# Patient Record
Sex: MALE | Race: BLACK OR AFRICAN AMERICAN
[De-identification: names, ages, dates, MRNs, and addresses within clinical notes are randomized per-mention and may not be internally consistent; named-entity substitution may affect disease eponyms.]

---

## 2017-05-02 ENCOUNTER — HOSPITAL ENCOUNTER (OUTPATIENT)
Dept: HOSPITAL 84 - D.CATH | Age: 62
Discharge: HOME | End: 2017-05-02
Attending: INTERNAL MEDICINE
Payer: MEDICARE

## 2017-05-02 VITALS — BODY MASS INDEX: 34.93 KG/M2 | HEIGHT: 68 IN | WEIGHT: 230.48 LBS

## 2017-05-02 VITALS — DIASTOLIC BLOOD PRESSURE: 73 MMHG | SYSTOLIC BLOOD PRESSURE: 144 MMHG

## 2017-05-02 DIAGNOSIS — I25.82: ICD-10-CM

## 2017-05-02 DIAGNOSIS — E78.5: ICD-10-CM

## 2017-05-02 DIAGNOSIS — T82.855A: ICD-10-CM

## 2017-05-02 DIAGNOSIS — I25.119: Primary | ICD-10-CM

## 2017-05-02 DIAGNOSIS — I10: ICD-10-CM

## 2017-05-02 LAB
ANION GAP SERPL CALC-SCNC: 17.8 MMOL/L (ref 8–16)
BASOPHILS NFR BLD AUTO: 0.6 % (ref 0–2)
BUN SERPL-MCNC: 43 MG/DL (ref 7–18)
CALCIUM SERPL-MCNC: 7.8 MG/DL (ref 8.5–10.1)
CHLORIDE SERPL-SCNC: 102 MMOL/L (ref 98–107)
CO2 SERPL-SCNC: 24.8 MMOL/L (ref 21–32)
CREAT SERPL-MCNC: 10 MG/DL (ref 0.6–1.3)
EOSINOPHIL NFR BLD: 12.4 % (ref 0–7)
ERYTHROCYTE [DISTWIDTH] IN BLOOD BY AUTOMATED COUNT: 18.2 % (ref 11.5–14.5)
GLUCOSE SERPL-MCNC: 112 MG/DL (ref 74–106)
HCT VFR BLD CALC: 47 % (ref 42–54)
HGB BLD-MCNC: 14.7 G/DL (ref 13.5–17.5)
IMM GRANULOCYTES NFR BLD: 0.1 % (ref 0–5)
LYMPHOCYTES NFR BLD AUTO: 19.9 % (ref 15–50)
MCH RBC QN AUTO: 29.1 PG (ref 26–34)
MCHC RBC AUTO-ENTMCNC: 31.3 G/DL (ref 31–37)
MCV RBC: 93.1 FL (ref 80–100)
MONOCYTES NFR BLD: 8.8 % (ref 2–11)
NEUTROPHILS NFR BLD AUTO: 58.2 % (ref 40–80)
OSMOLALITY SERPL CALC.SUM OF ELEC: 289 MOSM/KG (ref 275–300)
PLATELET # BLD: 186 10X3/UL (ref 130–400)
PMV BLD AUTO: 10.7 FL (ref 7.4–10.4)
POTASSIUM SERPL-SCNC: 5.6 MMOL/L (ref 3.5–5.1)
RBC # BLD AUTO: 5.05 10X6/UL (ref 4.2–6.1)
SODIUM SERPL-SCNC: 139 MMOL/L (ref 136–145)
WBC # BLD AUTO: 7.2 10X3/UL (ref 4.8–10.8)

## 2017-05-02 NOTE — HEMODYNAMI
PATIENT:BALA SCHULTZ                               MEDICAL RECORD: [id number]
: 57                                            LOCATION:D.CAT          
ACCT# [id number]                                       ADMISSION DATE: 17
 
 
 Generatedon:201710:24
Patient name: BALA SCHULTZ Patient #: [id number] Visit #: [id number] SSN: :
 1955
Date of study: 2017
Page: Of
Hemodynamic Procedure Report
****************************
Patient Data
Patient Demographics
Procedure consent was obtained
First Name: BALA          Gender: Male
Last Name: MARION            : 1955
[facility] Initial: WILD       Age: 61 year(s)
Patient #: [id number]       Race: Black
Visit #: [id number]
Accession #:
[id number]
Additional ID: [id number]
Contact details
Address: [address] [place] [address] Phone: [phone]
State: [place]
City: [place]
Zip code: [address]
Past Medical History
Allergies
Allergen        Reaction        Date         Comments
Reported
Other allergy                   2017     Gabapentin
Admission
Admission Data
Admission Date: 2017    Admission Time: 8:03
Lab Results
Lab Result Date: 2017   Lab Result Time: 0:00
Biochemistry
Name         Units    Result                Min      Max
BUN          mg/dl    43       --(----)-*   7        18
Creatinine   mg/dl    10       --(----)-*   0.6      1.3
Procedure
Procedure Types
Cath Procedure
Diagnostic Procedure
LHC
LH w/Coronaries
Procedure Description
Procedure Date
Procedure Date: 2017
Procedure Start Time: 10:09
Procedure End Time: 10:23
Procedure Staff
Name                            Function
Saurabh Galan MD                Performing Physician
Keely Long RT               Scrub
Mary Almaguer RN                  Nurse
Charles Phan RT                  Monitor
 
Procedure Data
Cath Procedure
Fluoroscopy
Diagnostic fluoroscopy      Total fluoroscopy Time: 1.2
time: 1.2 min               min
Diagnostic fluoroscopy      Total fluoroscopy dose: 517
dose: 517 mGy               mGy
Contrast Material
Contrast Material Type                       Amount (ml)
Isovue 300                                   51
Entry Location
Entry     Primary  Successful  Side  Size  Upsize Upsize Entry    Closure Succes
sful  Closure
Location                             (Fr)  1 (Fr) 2 (Fr) Remarks  Device        
      Remarks
Femoral                        Right 5 Fr                         Exoseal
artery
Estimated blood loss: 10 ml
Diagnostic catheters
Device Type               Used For           End Catheter
Placement
Medtronic Dexterity 5Fr   Procedure
Pigtail catheter (NO
CHARGE)
Medtronic Dexterity 5Fr   Procedure
JL 4.0 catheter (NO
CHARGE)
Medtronic Dexterity 5Fr   Procedure
3DRC catheter (NO CHARGE)
Procedure Medications
Medication           Administration Route Dosage
Oxygen               NC                   2 l/min
Lidocaine 2%         added to field       20
Heparin Flush Bag    added to field       2 bags
(1000units/500ml NS)
0.9% NaCl            I.V.                 50 ml/hr
Versed               I.V.                 1 mg
Fentanyl             I.V.                 50 mcg
Versed               I.V.                 1 mg
Fentanyl             I.V.                 50 mcg
Hemodynamics
Rest
Heart Rate: 61 (bpm)
Pressure Samples
Time     Site     Value (mmHg) Purpose      Heart      Use
Rate(bpm)
10:11    LV       109/24,28    Snapshot     87
Snapshots
Pre Cath      Intra         NCS           Post Cath
Vital Signs
Time     Heart  Resp   SPO2 NIBP (mmHg) Rhythm  Pain    Sedation
Rate   (ipm)  (%)                      Status  Level
(bpm)
9:20:42  68     17     98   183/62(135) NSR     0 (11)  10(A)
, No
pain
9:25:39  67     27     98   178/61(129) NSR     0 (11)  10(A)
, No
pain
9:30:32  67     21     94   142/60(116) NSR     0 (11)  10(A)
 
, No
pain
9:35:08  65     17     93   156/64(122) NSR     0 (11)  10(A)
, No
pain
9:39:51  69     15     96   148/67(125) NSR     0 (11)  10(A)
, No
pain
9:45:37  68     19     97   145/60(122) NSR     0 (11)  10(A)
, No
pain
9:50:17  70     22     96   143/55(100) NSR     0 (11)  10(A)
, No
pain
9:54:58  68     17     95   154/59(92)  NSR     0 (11)  10(A)
, No
pain
9:59:42  66     16     94   140/57(121) NSR     0 (11)  10(A)
, No
pain
10:04:21 70     19     94   129/60(99)  NSR     0 (11)  10(A)
, No
pain
10:10:03 69     19     95   145/53(89)  NSR     0 (11)  9(A)
, No
pain
10:14:44 71     17     95   157/59(116) NSR     0 (11)  9(A)
, No
pain
10:19:30 70     16     91   162/56(114) NSR     0 (11)  10(A)
, No
pain
Medications
Time     Medication       Route  Dose  Verified Delivered Reason     Notes  Effe
ctiveness
by       by
9:25:16  Oxygen           NC     2     Saurabh  Buffie    used for
l/min Aadma Almaguer RN   procedure
9:25:23  Lidocaine 2%     added  20ml  Saurabh  Saurabh   for local
to     vial  Adama Galan MD  anesthetic
field
9:25:28  Heparin Flush    added  2     Saurabh  Saurabh   used for
Bag              to     bags  Adama Galan MD  procedure
(1000units/500ml field
NS)
9:25:37  0.9% NaCl        I.V.   50    Saurabh  Buffie    Per
ml/hr Adama Almaguer RN   physician
10:07:51 Versed           I.V.   1 mg  Saurabh Hernandezie    for
Adama Almaguer RN   sedation
10:07:56 Fentanyl         I.V.   50    Saurabh  Buffie    for
mcg   Adama Almaguer RN   sedation
10:14:42 Versed           I.V.   1 mg  Saurabh  Buffie    for
Adama Almaguer RN   sedation
10:14:46 Fentanyl         I.V.   50    Saurabh  Buffie    for
mcg   Adama Almaguer RN   sedation
Procedure Log
Time     Note
9:14:00  Diagnostic Cath Status : Elective
9:14:52  Keely Long RT(R) sent for patient. Start room use.
9:14:56  Time tracking: Regular hours
 
9:15:03  Plan of Care:Hemodynamics will remain stable., Cardiac
rhythm will remain stable., Comfort level will be
maintained., Respiratory function will remain
adequate., Patient/ family verbilizes understanding of
procedure., Procedure tolerated without complication.,
Recovers from procedure without complications..
9:15:13  Patient received from Pre/Post Procedure Room to CCL 2
Alert and oriented. Tansferred to table in Supine
position.
9:15:16  Warm blankets applied, and kemal hugger turned on for
patient comfort.
9:15:18  Correct patient and procedure confirmed by team.
9:15:20  Signed procedure consent form obtained from patient.
9:15:24  ECG and BP/O2 sat monitors applied to patient.
9:18:57  Vital chart was started
9:19:56  Baseline sample Acquired.
9:20:04  Full Disclosure recording started
9:21:22  Snore? Yes
9:21:23  Sleep apnea? Yes
9:21:28  Patient diabetic? Yes.
9:21:29  If diabetic: On Metformin? No
9:23:41  Pre-procedure instructions explained to patient.
9:23:43  Family in waiting room.
9:23:45  Patient NPO since Midnight.
9:24:12  Patient allergic to Other allergyGabapentin
9:24:15  Is the patient allergic to Iodine/contrast media? No.
9:24:18  Is patient on blood thinner?No
9:24:31  Dentures? No ?
9:24:49  IV patent on arrival in left forearm with 0.9% NaCl at
[facility].
9::56  Lab results completed and on chart, pending.
9:25:00  Right groin area was prepped with chlora-prep and
draped in sterile fashion
9:25:01  Alarms reviewed by R. N.
9:25:02  Sharps counted by scrub and verified by R.N.
9:25:02  Physician paged
9:25:16  Oxygen 2 l/min NC was administered by Mary Almaguer RN;
used for procedure;
9:25:23  Lidocaine 2% 20ml vial added to field was administered
by Saurabh Galan MD; for local anesthetic;
9:25:28  Heparin Flush Bag (1000units/500ml NS) 2 bags added to
field was administered by Saurabh Galan MD; used for
procedure;
9:25:37  0.9% NaCl 50 ml/hr I.V. was administered by Mary Almaguer RN; Per physician;
9::28  Use device set Femoral Dx
9:26:30  Acist Syringe opened to sterile field.
9:26:30  Bag Decanter opened to sterile field.
9:26:36  Medline Cath Pack opened to sterile field.
9:26:36  Terumo 5Fr [place] Sheath opened to sterile field.
9:26:37  St Vladimir 260cm J .035 wire opened to sterile field.
9:26:39  Acist Hand Control opened to sterile field.
9:26:40  Acist Manifold opened to sterile field.
9::44  Tegaderm 4 x 4 opened to sterile field.
9::40  Lab Result : BUN 43 mg/dl
9:29:40  Lab Result : Creatinine 10 mg/dl
9:33:12  Deviated septum? No
9:33:14  Opens mouth fully? Yes
9:33:17  Sticks out tongue? Yes
9:33:21  Airway obstruction? No ?
 
9:33:49  Pre procedure: right dorsailis pedis pulse 1+
Palpable, but thready & weak; easily obliterated
9:33:54  Patient pain scale 0/10 ?.
9:34:12  Patient is right arm reserve
9:36:11  Dr. Galan in room 1
9:52:50  Baseline sample Acquired.
9:52:59  Rhythm: sinus rhythm
9:53:06  Baseline sample Acquired.
10:07:08 Physician arrived
10:07:09 --------ALL STOP TIME OUT------
10:07:10 Final Timeout: patient, procedure, and site verified
with staff and physician. All members of the team are
in agreement.
10:07:12 Right groin site verified by team.
10:07:16 Physical assessment completed. ASA score P 3 - A
patient with severe systemic disease as per Saurabh Galan MD.
10:07:21 Sedation plan: IV Moderate Sedation Versed, Fentanyl
10:07:36 Zero performed for pressure channel P1
10:07:51 Versed 1 mg I.V. was administered by Mary Almaguer RN;
for sedation;
10:07:56 Fentanyl 50 mcg I.V. was administered by Mary Almaguer
RN; for sedation;
10:09:21 Procedure started.
10:09:27 Local anesthetic to right femoral artery with
Lidocaine 2% by Saurabh Galan MD.**INITIAL ACCESS
ONLY**
10:10:16 A 5 Fr sheath was inserted into the Right Femoral
artery
10:10:28 A Medtronic Dexterity 5Fr Pigtail catheter (NO CHARGE)
was advanced over the wire and used for Procedure.
10:11:42 LV hemodynamics recorded.
10:11:54 LV gram done using MEDRANO
10:11:58 LV Function : Abnormal
10:12:04 EF : 30 %
10:12:07 Catheter removed.
10:12:24 A Medtronic Dexterity 5Fr JL 4.0 catheter (NO CHARGE)
was advanced over the wire and used for Procedure.
10:12:53 LCA angiography performed.
10:14:07 Catheter removed.
10:14:16 A Medtronic Dexterity 5Fr 3DRC catheter (NO CHARGE)
was advanced over the wire and used for Procedure.
10:14:42 Versed 1 mg I.V. was administered by Mary Almaguer RN;
for sedation;
10:14:46 Fentanyl 50 mcg I.V. was administered by Mary Almaguer
RN; for sedation;
10:14:49 RCA angiography performed.
10:14:57 Catheter removed.
10:15:51 Cordis 5Fr Exoseal opened to sterile field.
10:16:05 Sheath removed intact; hemostasis achieved with
Exoseal to the Right Femoral artery.
10:16:07 Procedure ended.(Physican Out)
10:16:21 Fluoroscopy time 01.20 minutes.
10:16:29 Flurop Dose total: 517
10:16:29 Fluoroscopy dose: 517 mGy
10:16:40 Contrast amount:Isovue 300 51ml.
10:16:41 Sharps counted by scrub and verified by R.N.
10:21:26 Insertion/operative site no bleeding no hematoma.
10:21:30 Post-op/insertion site Right Femoral artery dressed
using a 4 x 4 and Tegaderm.
 
10:21:35 Post right femoral artery:stable
10:21:38 Post Procedure Pulses reassessed and unchanged
10:21:43 Post-procedure physical assessment completed. ASA
score P 3 - A patient with severe systemic disease as
per Saurabh Galan MD.
10:21:47 Post procedure rhythm: unchanged.
10:21:50 Estimated blood loss: 10 ml
10:21:51 Post procedure instruction explained to
patient.Patient verbalizes understanding.
10:21:52 Patient needs reinforcement of post procedure
teaching.
10:21:54 Procedure and supply charges have been captured,
reviewed, submitted and are correct.
10:22:37 Vital chart was stopped
10:23:11 See physician's report for complete and final results.
10:23:13 Report given to Pre/Post Procedure Room.
10:23:35 Patient transfered to Pre/Post Procedure Room with
Stretcher.
10:23:39 Procedure ended.
10:23:39 Full Disclosure recording stopped
10:23:42 End room use (Document Last)
Device Usage
Item Name Manufacture  Quantity  Catalog     Hospital Part    Current Minimal Lo
t# /
Number      Charge   Number  Stock   Stock   Serial#
Code
Acist     Acist        1         63855       315826   155417  239380  20
Everloop
Bag       [employer]tek     1                970233   77164   095306  5
[employer]
Medline   Cardinal     1         [id number]   847583   53178   244718  5
Cath Pack [employer]
Terumo    Terumo       1         [id number]      597059   762355  631160  40
5Fr
[place]
Sheath
St Vladimir   St Vladimir      1         917147      871334   406801  782844  30
260cm J
.035 wire
Acist     Acist        1         47659       334337   559641  452094  5
Hand      Medical
Control   Systems Inc
Acist     Acist        1         33785       815806   128542  130976  5
Manifold  Medical
Systems [employer]
Tegaderm  3M           1         1626W       960337   718508  022682  5
4 x 4
Medtronic Medtronic    1         [id number]  902140           183154  5
Dexterity
5Fr
Pigtail
catheter
(NO
CHARGE)
Medtronic Medtronic    1         [id number]    125946           328699  5
Dexterity
5Fr JL
4.0
catheter
 
(NO
CHARGE)
Medtronic Medtronic    1         [id number]    877095           169073  5
Dexterity
5Fr 3DRC
catheter
(NO
CHARGE)
Cordis    Cardinal     1                433621   321247  276058  10
5Fr       Health
Exoseal
Signature Audit Raysal
Stage           Time        Signature      Unsigned
Intra-Procedure 2017    Charles Phan
10:24:15 AM RT(R) (CV)
Signatures
Monitor : Charles Phan RT Signature :
______________________________
Date : ______________ Time :
______________
 
 
 
 
 
 
 
 
 
 
 
 
 
 
 
 
 
 
 
 
 
 
 
 
 
 
 
 
 
 
 
 
 
 
 
[address] [place] [address]

## 2017-05-02 NOTE — NUR
RESTING QUIETLY WITH EYES CLOSED. VSS. 2L NC, NO RESP DISTRESS NOTED.
RIGHT GROIN EXOSEAL CDI, NO BLEEDING OR HEMATOMA NOTED. NO C/O CHEST
PAIN OR NAUSEA. WILL CONTINUE TO MONITOR.

## 2017-05-02 NOTE — NUR
2L NC, NO RESP DISTRESS NOTED. RIGHT GROIN EXOSEAL CDI, NO BLEEDING
OR HEMATOMA NOTED. NO C/O CHEST PAIN OR NAUSEA. VSS. CARDIAC MONITOR
SHOWS NSR @ 70. INSTRUCTED PT TO KEEP HEAD FLAT ON PILLOW AND RIGHT
LEG STRAIGHT.

## 2017-05-02 NOTE — OP
PATIENT NAME:  BALA SCHULTZ                        MEDICAL RECORD: [id number]
:57                                             LOCATION:D.CAT          
                                                         ADMISSION DATE:        
SURGEON:  BREANN MUHAMMAD MD             
 
 
DATE OF OPERATION:  2017
 
PROCEDURES:
1.  Left heart catheterization.
2.  Selective coronary angiography.
3.  Left ventriculogram.
 
PROCEDURE IN DETAIL:  After informed consent was obtained and after a detailed
explanation of risks, benefits as well as alternative therapies, the patient
elected to proceed with angiogram and heart catheterization.  The right femoral
area was prepped and draped in normal sterile fashion.  The right femoral artery
was cannulated via modified Seldinger technique with placement of 6-Pakistani
sheath.  All catheters exchanged through this sheath.
 
FINDINGS:  The left ventriculogram was performed in the standard 30-degree MEDRANO
view, reveals ejection fraction in the 35% range.
 
SELECTIVE CORONARY ANGIOGRAPHY:
1.  Left main showed no significant angiographic disease.
2.  Left anterior descending has previously placed stents, these are widely
patent with no significant restenosis.
3.  Left circumflex has moderate irregularities, but no flow-limiting stenosis.
4.  Right coronary has previously placed stents, these were totally occluded in
the mid vessel, there is chronic total occlusion.  Distal right coronary fills
via left-to-right collaterals.
 
OVERALL IMPRESSION:  Total occlusion of the RCA with left to right collaterals. 
Otherwise, no significant disease.  Continue medical management of the coronary
artery disease and cardiac risk factors.
 
TRANSINT:[id number] Voice Confirmation ID: [id number] DOCUMENT ID: [id number]
                                           
                                           BREANN MUHAMMAD MD             
 
 
 
 
 
 
 
 
 
 
 
CC:                                                             [id number]
DICTATION DATE: 17 1028     :     17 1119      [address] [place] [address]

## 2017-05-02 NOTE — NUR
2L NC, NO  RESP DISTRESS. RIGHT GROIN EXOSEAL KATLYN, NO BLEEDING OR
HEMATOMA NOTED. MONITOR SHOWS NSR @ 68. VSS. NO C/O CHEST PAIN OR
NAUSEA. WIFE AT BEDSIDE, CALL LIGHT WITHIN REACH.

## 2017-05-02 NOTE — HP
PATIENT: BALA CLARKE                              MEDICAL RECORD: [id number]
ACCOUNT: [id number]                                    LOCATION:D.CAT         
: 57                                            ADMISSION DATE: 17
                                                         
 
                             HISTORY AND PHYSICAL EXAMINATION
 
 
ADMITTING DIAGNOSES:
1.  Angina.
2.  Coronary artery disease.
3.  Previous percutaneous transluminal coronary angioplasty stent, last being in
.
4.  Hyperlipidemia.
5.  Hypertension.
 
HISTORY OF PRESENT ILLNESS:  Mr. Clarke has increasing anginal symptomatology
just like that of his previous angina.  Last cardiac stent was approximately 3
years ago.  He has been in an escalating fashion despite maximal medical therapy
with calcium channel blockers and beta blockers.
 
PHYSICAL EXAMINATION:
GENERAL APPEARANCE:  Well-nourished, well-developed, appears stated age.  Level
of distress, comfortable. 
PSYCHIATRIC:  Mental status, alert, normal affect.  Orientation, oriented to
time, place and person.  
EYES:  Lids and conjunctiva, noninjected.  No discharge, no pallor. 
ENT:  Lips, teeth, gums, normal dentition.  Oropharynx, no cyanosis, no pallor. 
NECK:  Carotid arteries, bilateral normal upstroke, no bruits, no thrills. 
JUGULAR VEINS:  No jugular venous pressure or distention. 
CERVICAL LYMPH NODES:  Nontender, nonenlarged.  
THYROID:  Not enlarged.  Nontender.  No nodules. 
LUNGS:  Respiratory effort, unlabored. 
CHEST:  Normal curvature.  No thoracic deformity.  No chest wall tenderness. 
Percussion, resonant.  Auscultation, clear.  No wheezes, no rales, no rhonchi. 
CARDIOVASCULAR:  Precordial exam, nondisplaced.  No heaves or pericardial
thrills.  Rate and rhythm, regular.  Heart sounds, normal S1, normal S2.  No S3,
no gallop, no rub.  Systolic murmur, not heard.  Diastolic murmur, not heard. 
EXTREMITIES:  No cyanosis, no edema.  Peripheral pulses, full and equal in all
extremities, except as noted.  No bruits appreciated. 
ABDOMEN:  Soft, nondistended.  Normal aorta.  No bruit.  Nontender.  No masses. 
Liver, nontender, no hepatomegaly.  Spleen, nontender, no splenomegaly.  
MUSCULOSKELETAL:  No joint tenderness.  No joint swelling.  No erythema.  
NEUROLOGICAL:  Normal gait, normal strength, normal tone.
SKIN:  Warm and dry.
 
REVIEW OF SYSTEMS:  The patient reports easy bruising but reports no swollen
glands. The patient reports no fever, no night sweats, no significant weight
gain, no significant weight loss.  No significant exercise tolerance.  The
patient reports no dry eyes, no irritation, no vision change.  Patient reports
no difficulty hearing and no ear pain.  Patient reports no frequent nose bleeds
or nose and sinus problems.  Patient reports on arm pain on exertion.   No
shortness of breath while lying down.  No history of heart murmur.  Patient
reports no cough, no wheezing or coughing up blood.  Patient reports no
abdominal pain, no vomiting.  Normal appetite.  No diarrhea and not vomiting
blood.  No nausea and no constipation.  Patient reports no incontinence.  No
difficulty urinating.  No hematuria.  No increased frequency.  Patient reports
no muscle aches.  No weakness, no arthralgias, no back pain.  No swelling of the
 
 
 
HISTORY AND PHYSICAL                           [id number]    MARION,BALA WILD      
 
 
extremities.  Patient reports no abnormal mole, no jaundice, no rashes.  Reports
no loss of consciousness.  No weakness and no numbness.  No seizures, dizziness,
or headaches.  The patient reports no depression, no sleep disturbance, feeling
safe in a relationship and no alcohol abuse.  Patient reports on fatigue. 
Reports no runny nose or sinus pressure.  No itching, no hives, and no frequent
sneezing.  
 
OVERALL IMPRESSION:  Increasing angina.  We will proceed with coronary
angiography.  Further care depends upon findings of the angiography.
 
TRANSINT:[id number] Voice Confirmation ID: [id number] DOCUMENT ID: [id number]
 
 
                                           
                                           BREANN MUHAMMAD MD             
 
 
 
 
 
 
 
 
 
 
 
 
 
 
 
 
 
 
 
 
 
 
 
 
 
 
 
 
 
 
 
 
CC:                                                             [id number]
DICTATION DATE: 17     :     17      REG [facility]                                          
[address] [place] [address]

## 2017-11-20 ENCOUNTER — HOSPITAL ENCOUNTER (OUTPATIENT)
Dept: HOSPITAL 84 - D.CT | Age: 62
Discharge: HOME | End: 2017-11-20
Attending: INTERNAL MEDICINE
Payer: MEDICARE

## 2017-11-20 VITALS — BODY MASS INDEX: 35 KG/M2

## 2017-11-20 DIAGNOSIS — N18.6: Primary | ICD-10-CM

## 2017-11-20 DIAGNOSIS — Z86.79: ICD-10-CM

## 2017-12-28 ENCOUNTER — HOSPITAL ENCOUNTER (OUTPATIENT)
Dept: HOSPITAL 84 - D.CT | Age: 62
Discharge: HOME | End: 2017-12-28
Attending: INTERNAL MEDICINE
Payer: MEDICARE

## 2017-12-28 VITALS — BODY MASS INDEX: 35 KG/M2

## 2017-12-28 DIAGNOSIS — J98.11: Primary | ICD-10-CM

## 2018-07-31 ENCOUNTER — HOSPITAL ENCOUNTER (OUTPATIENT)
Dept: HOSPITAL 84 - D.CT | Age: 63
Discharge: HOME | End: 2018-07-31
Attending: INTERNAL MEDICINE
Payer: MEDICARE

## 2018-07-31 VITALS — BODY MASS INDEX: 35 KG/M2

## 2018-07-31 DIAGNOSIS — J98.11: Primary | ICD-10-CM

## 2019-02-18 ENCOUNTER — HOSPITAL ENCOUNTER (OUTPATIENT)
Dept: HOSPITAL 84 - D.OPS | Age: 64
Discharge: HOME | End: 2019-02-18
Attending: OTOLARYNGOLOGY
Payer: MEDICARE

## 2019-02-18 VITALS
WEIGHT: 235 LBS | HEIGHT: 68 IN | BODY MASS INDEX: 35.61 KG/M2 | WEIGHT: 235 LBS | HEIGHT: 68 IN | BODY MASS INDEX: 35.61 KG/M2

## 2019-02-18 VITALS — DIASTOLIC BLOOD PRESSURE: 90 MMHG | SYSTOLIC BLOOD PRESSURE: 153 MMHG

## 2019-02-18 DIAGNOSIS — Z01.812: ICD-10-CM

## 2019-02-18 DIAGNOSIS — E11.22: ICD-10-CM

## 2019-02-18 DIAGNOSIS — Z95.0: ICD-10-CM

## 2019-02-18 DIAGNOSIS — K21.9: ICD-10-CM

## 2019-02-18 DIAGNOSIS — J32.0: Primary | ICD-10-CM

## 2019-02-18 DIAGNOSIS — Z95.1: ICD-10-CM

## 2019-02-18 DIAGNOSIS — Z79.02: ICD-10-CM

## 2019-02-18 DIAGNOSIS — Z79.891: ICD-10-CM

## 2019-02-18 DIAGNOSIS — Z99.2: ICD-10-CM

## 2019-02-18 DIAGNOSIS — N18.6: ICD-10-CM

## 2019-02-18 LAB
ANION GAP SERPL CALC-SCNC: 18.7 MMOL/L (ref 8–16)
APTT BLD: 41.9 SECONDS (ref 22.8–39.4)
BASOPHILS NFR BLD AUTO: 0.1 % (ref 0–2)
BUN SERPL-MCNC: 43 MG/DL (ref 7–18)
CALCIUM SERPL-MCNC: 8.5 MG/DL (ref 8.5–10.1)
CHLORIDE SERPL-SCNC: 103 MMOL/L (ref 98–107)
CO2 SERPL-SCNC: 23.3 MMOL/L (ref 21–32)
CREAT SERPL-MCNC: 8.5 MG/DL (ref 0.6–1.3)
EOSINOPHIL NFR BLD: 1.8 % (ref 0–7)
ERYTHROCYTE [DISTWIDTH] IN BLOOD BY AUTOMATED COUNT: 15.8 % (ref 11.5–14.5)
GLUCOSE SERPL-MCNC: 123 MG/DL (ref 74–106)
HCT VFR BLD CALC: 40.3 % (ref 42–54)
HGB BLD-MCNC: 13.3 G/DL (ref 13.5–17.5)
IMM GRANULOCYTES NFR BLD: 0.2 % (ref 0–5)
INR PPP: 1.08 (ref 0.85–1.17)
LYMPHOCYTES NFR BLD AUTO: 11.1 % (ref 15–50)
MCH RBC QN AUTO: 29.8 PG (ref 26–34)
MCHC RBC AUTO-ENTMCNC: 33 G/DL (ref 31–37)
MCV RBC: 90.2 FL (ref 80–100)
MONOCYTES NFR BLD: 5.1 % (ref 2–11)
NEUTROPHILS NFR BLD AUTO: 81.7 % (ref 40–80)
OSMOLALITY SERPL CALC.SUM OF ELEC: 290 MOSM/KG (ref 275–300)
PLATELET # BLD: 205 10X3/UL (ref 130–400)
PMV BLD AUTO: 10.8 FL (ref 7.4–10.4)
POTASSIUM SERPL-SCNC: 5 MMOL/L (ref 3.5–5.1)
PROTHROMBIN TIME: 13.5 SECONDS (ref 11.6–15)
RBC # BLD AUTO: 4.47 10X6/UL (ref 4.2–6.1)
SODIUM SERPL-SCNC: 140 MMOL/L (ref 136–145)
WBC # BLD AUTO: 8.4 10X3/UL (ref 4.8–10.8)

## 2019-02-20 LAB — SPECIMEN PREPARATION: (no result)

## 2019-02-25 NOTE — OP
PATIENT NAME:  BALA SCHULTZ                        MEDICAL RECORD: [id number]
:57                                             LOCATION:[facility]          
                                                         ADMISSION DATE:        
SURGEON:  RHONDA GILLIAM MD                
 
 
DATE OF OPERATION:  2019
 
PREOPERATIVE DIAGNOSES:  Bilateral chronic maxillary sinusitis.
 
POSTOPERATIVE DIAGNOSES:  Bilateral chronic maxillary sinusitis.
 
PROCEDURES:  Bilateral middle meatal antrostomy.
 
SURGEON:  Rhonda Gilliam MD
 
ANESTHESIA:  General orotracheal.
 
BLOOD LOSS:  Less than 5 cc.
 
SPECIMENS:  Cultures and path of material from the left maxillary sinus.
 
COMPLICATIONS:  None.
 
PACKING:  None.
 
DISPOSITION:  Recovery, stable.
 
DESCRIPTION OF PROCEDURE:  He was brought to the operating room, placed in the
supine position, and sedated and intubated by anesthesia.  Both sides of the
nose were examined using headlight and nasal speculum.  Inferior turbinate,
middle turbinate, and uncinate lateral nasal wall were injected with a total 1.5
cc of 1% lidocaine with 1:100,000 epinephrine.  He had been decongested with
Afrin preoperatively.  Two Afrin pledgets were placed in the middle meatus
bilaterally and table was turned 90 degrees.  He was positioned, prepped, and
draped in the usual fashion for nasal surgery.  Using a 0-degree scope, both
sides of the nose were examined.  All the Afrin pledgets were removed.  The
right side was examined first.  Inferior turbinate was normal.  The right middle
meatus was opened slightly, but the maxillary sinus was fairly clean and there
was nothing in there.  The ethmoid cavity was checked as well and it was clean. 
The sinus was irrigated and examined, and was normal; just opened up a little
bit there.  The nasal cavity and nasopharynx were normal.  There was some septal
deviation, but not too bad.  The left side was then examined and there was
obvious purulence coming from the middle meatus.  The middle turbinate was
gently medialized.  There was a Gurdeep's cell there with some granular changes
and some granular edematous tissue covering the natural meatus.  Forceps was
used to remove quite a bit of that for pathology.  Then, the middle meatus was
entered with a large curved olive tip suction.  It was suctioned with a Luki
trap.  Some chunks of solid green material were obtained.  This was sent for
cultures including aerobic, anaerobic, and fungal.  Using a microdebrider, the
anterior ethmoids were taken down somewhat and then the maxillary ostia was
opened widely, again the 30-degree scope in there and long curved olive tip
suction.  The superior sinus cleaned out easily, but the inferior half of the
sinus was basically a solid block of hard green material.  This could be broken
up, but it was difficult to remove.  Another curved olive tip suction was
inserted in the inferior meatus and popped into the maxillary sinus to help
break up and debride that material, that combined with irrigating in one suction
and suctioning with other simultaneously with 100 cc of saline, was able to
 
 
 
OPERATIVE REPORT                               [id number]    BALA SCHULTZ      
 
 
break up and remove all that.  I then examined the maxillary sinus with 30- and
70-degree scopes.  There were some granular irregular changes to the mucosa from
chronic infection, but the sinus was completely clean with a nice clean large
maxillary ostia.  The ethmoids were irrigated.  The nasopharynx was irrigated
with everything completely clean and dry.  Curved olive tip suction with some
mupirocin was used to place in the left maxillary sinus and the ethmoid cavity. 
He was awakened, extubated, and transported to recovery in good condition.  No
complications.
 
TRANSINT:[id number] Voice Confirmation ID: [id number] DOCUMENT ID: [id number]
                                           
                                           RHONDA GILLIAM MD                
 
 
 
Electronically Signed by RHONDA GILLIAM on 19 at 0845
 
 
 
 
 
 
 
 
 
 
 
 
 
 
 
 
 
 
 
 
 
 
 
 
 
 
 
 
 
 
 
CC:                                                             [id number]
DICTATION DATE: 19 1504     :     19 1743      [facility] 
                                                                      19
[facility]                                          
[address] [place] [address]

## 2019-02-25 NOTE — HP
PATIENT: BALA CLARKE                              MEDICAL RECORD: [id number]
ACCOUNT: [id number]                                    LOCATION:\[facility] [place] [facility]\""         
: 57                                            ADMISSION DATE: 19
                                                         PCP: JAYMIE PAULSON MD           
 
                             HISTORY AND PHYSICAL EXAMINATION
 
 
HISTORY:  Mr. Clarke has been having chronic problems with drainage and been
found to have opacified left maxillary sinus, has not been responding to medical
therapy.  He is being admitted for bilateral middle meatal antrostomy.
 
PAST MEDICAL HISTORY:  Includes diabetes, end-stage renal disease, reflux,
pacemaker.
 
PAST SURGICAL HISTORY:  Includes CABG, hernia repair, knee surgery.  He is on
dialysis.
 
MEDICATIONS:  Include Plavix, amlodipine, tramadol, Lyrica, metoprolol,
omeprazole, Linzess, Sensipar, Breo, Renvela, albuterol.
 
ALLERGIES:  No known drug allergies.
 
PHYSICAL EXAMINATION:
GENERAL:  Healthy-appearing.
FACE:  Normal, symmetric, no lesions.
EYES:  Sclerae and conjunctivae are normal.
EARS:  Canals and TMs are normal.
NOSE:  There is a thick purulent drainage coming from the left maxillary sinus.
ORAL CAVITY AND OROPHARYNX:  Tongue protrudes in midline.  Pharynx is normal.
NECK:  No masses.  No adenopathy.
CHEST:  Clear.
CARDIOVASCULAR:  Regular rate and rhythm.  No murmur.
EXTREMITIES:  Normal.
 
IMPRESSION:  Chronic maxillary sinusitis.  Has a history of fungal sinusitis
back about 9 years ago.  I suspect that is same problem.  He is being admitted
for bilateral middle meatal antrostomy, for cultures and path.
 
TRANSINT:[id number] Voice Confirmation ID: [id number] DOCUMENT ID: [id number]
 
 
                                           
                                           RHONDA YOUNG MD                
 
 
 
Electronically Signed by RHONDA YOUNG on 19 at 0845
 
 
CC:                                                             [id number]
DICTATION DATE: 02/15/19 0930     :     02/15/19 1049      [facility] 
                                                                      [address]
[place] [address]

## 2019-02-26 LAB — FUNGUS MYCOLOGY CULTURE: (no result)

## 2020-02-26 ENCOUNTER — HOSPITAL ENCOUNTER (INPATIENT)
Dept: HOSPITAL 84 - D.ER | Age: 65
LOS: 21 days | Discharge: NURSING, REHAB OR LONG-TERM CARE | DRG: 981 | End: 2020-03-18
Attending: INTERNAL MEDICINE | Admitting: INTERNAL MEDICINE
Payer: MEDICARE

## 2020-02-26 VITALS — SYSTOLIC BLOOD PRESSURE: 175 MMHG | DIASTOLIC BLOOD PRESSURE: 101 MMHG

## 2020-02-26 VITALS — SYSTOLIC BLOOD PRESSURE: 113 MMHG | DIASTOLIC BLOOD PRESSURE: 60 MMHG

## 2020-02-26 VITALS
BODY MASS INDEX: 33.31 KG/M2 | BODY MASS INDEX: 33.31 KG/M2 | HEIGHT: 68 IN | HEIGHT: 68 IN | BODY MASS INDEX: 33.31 KG/M2 | WEIGHT: 219.76 LBS | WEIGHT: 219.76 LBS | BODY MASS INDEX: 33.31 KG/M2

## 2020-02-26 VITALS — SYSTOLIC BLOOD PRESSURE: 158 MMHG | DIASTOLIC BLOOD PRESSURE: 95 MMHG

## 2020-02-26 VITALS — SYSTOLIC BLOOD PRESSURE: 130 MMHG | DIASTOLIC BLOOD PRESSURE: 70 MMHG

## 2020-02-26 VITALS — DIASTOLIC BLOOD PRESSURE: 83 MMHG | SYSTOLIC BLOOD PRESSURE: 142 MMHG

## 2020-02-26 VITALS — DIASTOLIC BLOOD PRESSURE: 61 MMHG | SYSTOLIC BLOOD PRESSURE: 112 MMHG

## 2020-02-26 VITALS — SYSTOLIC BLOOD PRESSURE: 177 MMHG | DIASTOLIC BLOOD PRESSURE: 92 MMHG

## 2020-02-26 VITALS — DIASTOLIC BLOOD PRESSURE: 60 MMHG | SYSTOLIC BLOOD PRESSURE: 93 MMHG

## 2020-02-26 VITALS — SYSTOLIC BLOOD PRESSURE: 116 MMHG | DIASTOLIC BLOOD PRESSURE: 66 MMHG

## 2020-02-26 VITALS — SYSTOLIC BLOOD PRESSURE: 87 MMHG | DIASTOLIC BLOOD PRESSURE: 34 MMHG

## 2020-02-26 VITALS — SYSTOLIC BLOOD PRESSURE: 163 MMHG | DIASTOLIC BLOOD PRESSURE: 63 MMHG

## 2020-02-26 VITALS — DIASTOLIC BLOOD PRESSURE: 27 MMHG | SYSTOLIC BLOOD PRESSURE: 142 MMHG

## 2020-02-26 VITALS — SYSTOLIC BLOOD PRESSURE: 189 MMHG | DIASTOLIC BLOOD PRESSURE: 106 MMHG

## 2020-02-26 VITALS — SYSTOLIC BLOOD PRESSURE: 157 MMHG | DIASTOLIC BLOOD PRESSURE: 90 MMHG

## 2020-02-26 VITALS — DIASTOLIC BLOOD PRESSURE: 37 MMHG | SYSTOLIC BLOOD PRESSURE: 91 MMHG

## 2020-02-26 VITALS — DIASTOLIC BLOOD PRESSURE: 97 MMHG | SYSTOLIC BLOOD PRESSURE: 175 MMHG

## 2020-02-26 DIAGNOSIS — I50.23: ICD-10-CM

## 2020-02-26 DIAGNOSIS — J96.01: Primary | ICD-10-CM

## 2020-02-26 DIAGNOSIS — N18.6: ICD-10-CM

## 2020-02-26 DIAGNOSIS — G47.33: ICD-10-CM

## 2020-02-26 DIAGNOSIS — I21.4: ICD-10-CM

## 2020-02-26 DIAGNOSIS — D63.1: ICD-10-CM

## 2020-02-26 DIAGNOSIS — E87.2: ICD-10-CM

## 2020-02-26 DIAGNOSIS — I25.10: ICD-10-CM

## 2020-02-26 DIAGNOSIS — J96.02: ICD-10-CM

## 2020-02-26 DIAGNOSIS — Z91.19: ICD-10-CM

## 2020-02-26 DIAGNOSIS — I82.622: ICD-10-CM

## 2020-02-26 DIAGNOSIS — I13.2: ICD-10-CM

## 2020-02-26 DIAGNOSIS — N18.9: ICD-10-CM

## 2020-02-26 DIAGNOSIS — G93.41: ICD-10-CM

## 2020-02-26 DIAGNOSIS — E66.01: ICD-10-CM

## 2020-02-26 DIAGNOSIS — E11.22: ICD-10-CM

## 2020-02-26 DIAGNOSIS — Z99.2: ICD-10-CM

## 2020-02-26 LAB
ALBUMIN SERPL-MCNC: 4 G/DL (ref 3.4–5)
ALP SERPL-CCNC: 124 U/L (ref 30–120)
ALT SERPL-CCNC: 35 U/L (ref 10–68)
ANION GAP SERPL CALC-SCNC: 14.6 MMOL/L (ref 8–16)
APTT BLD: 25.6 SECONDS (ref 22.8–39.4)
BILIRUB SERPL-MCNC: 0.77 MG/DL (ref 0.2–1.3)
BUN SERPL-MCNC: 21 MG/DL (ref 7–18)
CALCIUM SERPL-MCNC: 8.7 MG/DL (ref 8.5–10.1)
CHLORIDE SERPL-SCNC: 99 MMOL/L (ref 98–107)
CK MB SERPL-MCNC: 6.8 U/L (ref 0–3.6)
CK SERPL-CCNC: 346 UL (ref 21–232)
CO2 SERPL-SCNC: 30.8 MMOL/L (ref 21–32)
CREAT SERPL-MCNC: 5.9 MG/DL (ref 0.6–1.3)
D DIMER PPP FEU-MCNC: 1.88 UG/MLFEU (ref 0.2–0.54)
EOSINOPHIL NFR BLD: 7 % (ref 0–7)
ERYTHROCYTE [DISTWIDTH] IN BLOOD BY AUTOMATED COUNT: 16.2 % (ref 11.5–14.5)
GLOBULIN SER-MCNC: 5.2 G/L
GLUCOSE SERPL-MCNC: 135 MG/DL (ref 74–106)
HCT VFR BLD CALC: 46.7 % (ref 42–54)
HGB BLD-MCNC: 15.1 G/DL (ref 13.5–17.5)
INR PPP: 0.98 (ref 0.85–1.17)
LYMPHOCYTES NFR BLD AUTO: 5 % (ref 15–50)
MCH RBC QN AUTO: 30.4 PG (ref 26–34)
MCHC RBC AUTO-ENTMCNC: 32.3 G/DL (ref 31–37)
MCV RBC: 94.2 FL (ref 80–100)
MONOCYTES NFR BLD: 6 % (ref 2–11)
NEUTROPHILS NFR BLD AUTO: 82 % (ref 40–80)
NT-PROBNP SERPL-MCNC: (no result) PG/ML (ref 0–125)
OSMOLALITY SERPL CALC.SUM OF ELEC: 283 MOSM/KG (ref 275–300)
PLATELET # BLD EST: NORMAL 10*3/UL
PLATELET # BLD: 242 10X3/UL (ref 130–400)
POTASSIUM SERPL-SCNC: 4.4 MMOL/L (ref 3.5–5.1)
PROT SERPL-MCNC: 9.2 G/DL (ref 6.4–8.2)
PROTHROMBIN TIME: 12.9 SECONDS (ref 11.6–15)
RBC # BLD AUTO: 4.96 10X6/UL (ref 4.2–6.1)
SODIUM SERPL-SCNC: 140 MMOL/L (ref 136–145)
TROPONIN I SERPL-MCNC: < 0.017 NG/ML (ref 0–0.06)
WBC # BLD AUTO: 7.9 10X3/UL (ref 4.8–10.8)

## 2020-02-26 PROCEDURE — 0BH17EZ INSERTION OF ENDOTRACHEAL AIRWAY INTO TRACHEA, VIA NATURAL OR ARTIFICIAL OPENING: ICD-10-PCS | Performed by: FAMILY MEDICINE

## 2020-02-26 PROCEDURE — 5A1955Z RESPIRATORY VENTILATION, GREATER THAN 96 CONSECUTIVE HOURS: ICD-10-PCS | Performed by: FAMILY MEDICINE

## 2020-02-26 NOTE — NUR
DR DIEGO AT .  REPEAT ABG'S DRAWN.  NO CHANGE.  DR DIEGO SPEAKING WITH S/O
RE:  INTUBATION D/T AMS.  S/O OK WITH POC.  MOVED TO T4 AND PREPARED FOR
INTUBATION.
@ 1425  RT, BONITA GRANT, RN AT BS.  PREMEDICATED WITH VERSED AND SUCC THEN
INTUBATED WITH 7.5 FR SECURED 24 CM AT THE LIP.  + BS, + ETCO2 COLOR CHANGE
PCXR ORDERED.  INTUBATION TIME= 1440
@1445 16 FR NGT PLACED WITH LARGE AMT CLEAR GASTRIC CONTENTS RTND.  PALCEMENT
CKED PER AUS AND GASTRIC CONTENT.  PT NAWAF WELL
 
PCXR COMPLETED

## 2020-02-26 NOTE — HEMODYNAMI
PATIENT:BALA SCHULTZ                               MEDICAL RECORD: [id number]
: 57                                            LOCATION:[facility]    D.2310
[id number]                                       ADMISSION DATE: 20
 
 
 Generatedon:202013:38
Patient name: BALA SCHULTZ Patient #: [id number] Visit #: [id number] SSN: [id number]
 : 1955 Date
of study: 2020
Page: Of
Hemodynamic Procedure Report
****************************
Patient Data
Patient Demographics
Procedure consent was obtained
First Name: BALA          Gender: Male
Last Name: MARION            : 1955
Middle Initial: WILD       Age: 64 year(s)
Patient #: [id number]       Race: Black
Visit #: [id number]
SSN: 
Accession #:
[id number]
Additional ID: [id number]
Contact details
Address: [address] [place] [address] Phone: [phone]
State: [place]
City: [place]
Zip code: [address]
Past Medical History
Allergies
Allergen        Reaction        Date         Comments
Reported
Other allergy                   2017     Gabapentin
Admission
Admission Data
Admission Date: 2020   Admission Time: 17:33
Arrival Date: 2020     Arrival Time: 0:00
Admit Source: Other         Insurance Payor: Medicare
Room #: D.2310              [facility] #: [id number]
Height (in.): 68            BSA: 2.23 (m2)
Height (cm.): 172.72        BMI: 37.24 (kg/m2)
Weight (lbs.): 244.93
Weight (kg.): 111.1
Lab Results
Lab Result Date: 2020  Lab Result Time: 0:00
Biochemistry
Name         Units    Result                Min      Max
BUN          mg/dl    49       --(----)-*   7        18
CK-MB        ng/ml    2.5      --(--*-)--   0        3.6
Creatinine   mg/dl    9.8      --(----)-*   0.6      1.3
eGFR         ml/min   6        *-(----)--   90       120
NONAFRICAN
Troponin l   ng/ml    0.274    --(----)-*   0        0.06
CBC
Name         Units    Result                Min      Max
Hematocrit   %        46.7     --(-*--)--   42       54
Hemoglobin   g/dl     15.1     --(-*--)--   13.5     17.5
Procedure
 
Procedure Types
Cath Procedure
Diagnostic Procedure
[facility] w/Coronaries
Sedation Charges
Moderate Sedation up to 15 minutes
PCI Procedure
Coronary Stent
Coronary Stent Initial
Hemochron ACT Test
Procedure Description
Procedure Date
Procedure Date: 2020
Procedure Start Time: 13:15
Procedure End Time: 13:36
Procedure Staff
Name                            Function
Mary Almaguer RN                  Nurse
Saurabh Galan MD                Performing Physician
Ashley Winslow RT               Monitor
Joselyn Carbone RT                Scrub
Tereza Jimenez RN                Monitor
Procedure Data
Cath Procedure
Fluoroscopy
Diagnostic fluoroscopy      Total fluoroscopy Time: 4.3
time: 4.3 min               min
Diagnostic fluoroscopy      Total fluoroscopy dose: 435
dose: 435 mGy               mGy
Contrast Material
Contrast Material Type                       Amount (ml)
Isovue 300                                   81
Entry Location
Entry     Primary  Successful  Side  Size  Upsize Upsize Entry    Closure Succes
sful  Closure
Location                             (Fr)  1 (Fr) 2 (Fr) Remarks  Device        
      Remarks
Femoral                        Right 5 Fr  6 Fr                   Exoseal
artery                                     Short
Estimated blood loss: 10 ml
Diagnostic catheters
Device Type               Used For           End Catheter
Placement
MULTIPACK Pigtail 5 Fr    Procedure
catheter
MULTIPACK JL 4.0 5Fr      Procedure
catheter
MULTIPACK 3DRC 5Fr        Procedure
catheter
Procedure Complications
No complications
Procedure Medications
Medication           Administration Route Dosage
Oxygen
Lidocaine 2%         added to field       20
Heparin Flush Bag    added to field       2 bags
(1000units/500ml NS)
0.9% NaCl            I.V.
Versed               I.V.                 0.5 mg
 
Heparin Bolus        I.V.                 4000 units
Integrilin (Bolus    I.V.                 10.2 ml
2mg/ml)
Plavix                                    600 mg
Hemodynamics
Rest
BSA: 2.23 (m2) HGB: 15.1 (g/dl) O2 Consumption: Estimated: 269.94 (ml/min) O2 Co
nsumption indexed:
Estimated:121.05 (ml/min/m) Heart Rate: 81 (bpm)
Snapshots
Pre Cath      Intra         NCS           Post Cath
Vital Signs
Time     Heart  Resp   SPO2 etCO2   NIBP (mmHg)  Rhythm  Pain      Sedation
Rate   (ipm)  (%)  (mmHg)                       Status    Level
(bpm)
13:04:39 82     21     90   0       116/28(81)   NSR     (Missing) 9(A)
13:13:40 81     37     93   0       171/136(149) NSR     (Missing) 9(A)
13:19:14 80     21     96   0       90/72(85)    NSR     (Missing) 9(A)
13:22:59 81     20     90   0       102/90(100)  NSR     (Missing) 9(A)
13:26:54 62     23     92   0       99/84(97)    NSR     (Missing) 9(A)
13:32:00 62     27     92   0       83/68(72)    NSR     (Missing) 9(A)
13:35:55 61     8      84   0       87/62(81)    NSR     (Missing) 9(A)
Medications
Time     Medication       Route       Dose       Verified Delivered Reason      
    Notes    Effectiveness
by       by
13:02:56 Oxygen           intubated   pt on      Saurabh  Buffie    Per physicia
n
ventilator Adama Almaguer RN
13:03:08 Lidocaine 2%     added to    20ml vial  Saurabhdayanara Wiley   for local
field                  Adama Galan MD  anesthetic
13:03:15 Heparin Flush    added to    2 bags     Saurabh  Saurabh   used for
Bag              field                  Adama Galan MD  procedure
(1000units/500ml
NS)
13:03:25 0.9% NaCl        I.V.        kvo ml/hr  Saurabh  Buffyamileth    Per physicia
n
Adama Almaguer RN
13:17:24 Versed           I.V.        0.5 mg     Saurabh Hernandezie    for sedation
Adama Almaguer RN
13:21:42 Heparin Bolus    I.V.        4000 units Saurabh Hernandez    for         
    verified
Adama Almaguer RN   anticoagulation with dr galan
13:24:39 Integrilin       I.V.        10.2 ml    Saurabh Hernandezie    for         
    wasted
(Bolus 2mg/ml)                          Adama Almaguer RN   antiplatelet    9.8 m
l
therapy         of vial.
13:33:23 Plavix           ngt-crushed 600 mg     Saurabh  Buffie    for
Admaa Almaguer RN   antiplatelet
therapy
Procedure Log
Time     Note
10:49:29 Informed consent obtained and on chart
12:12:48 Risk of Mortality: 1.7
12:12:52 Risk of blood transfusion: 2.0
12:12:55 Risk of LUANA: 18.6
12:13:00 Stress Test: no; N/A ?
12:13:06 Lab results completed and on chart.
 
12:13:41 Lab Result : eGFR NONAFRICAN 6 ml/min
12:13:41 Lab Result : Hemoglobin 15.1 g/dl
 Lab Result : Hematocrit 46.7 %
: Lab Result : Troponin l 0.274 ng/ml
 Lab Result : BUN 49 mg/dl
 Lab Result : Creatinine 9.8 mg/dl
: Lab Result : CK-MB 2.5 ng/ml
12:17:44 Arrival Date: 2020 12:00:00 AM
12:17:45 Admit Source: Other
12::49 Insurance Payor : Medicare
12:18:09 Patient Height : 68 inches
12:18:14 Patient Weight : 244.93 lbs
12::49 Diagnostic Cath Status : Urgent
12::28 Procedure Status Urgent Heart Cath (IP).
12:20:30 Time tracking: Regular hours (M-F 7:00 - 5:00)
12:20:33 Plan of Care:Hemodynamics will remain stable., Cardiac
rhythm will remain stable., Comfort level will be
maintained., Respiratory function will remain
adequate., Patient/ family verbilizes understanding of
procedure., Procedure tolerated without complication.,
Recovers from procedure without complications..
12:21:24 Mary Almaguer RN sent for patient. Start room use.
12:56:46 Patient received from ICU to CCL 3 On ventilator.
Tansferred to table in Supine position. PT IS ALERT
AND ORIENTED.
12:57:57 Warm blankets applied, and kemal hugger turned on for
patient comfort.
12:57:58 Correct patient and procedure confirmed by team.
12:57:59 ECG and BP/O2 sat monitors applied to patient.
12:58:31 Baseline sample Acquired.
12:58:35 Rhythm: sinus rhythm
12:59:54 H&P Date Dictated: 2020 New H&P dictated by
physician..
12:59:56 Patient NPO since Midnight.
13:00:00 Family in waiting room.
13:00:02 Pre-procedure instructions explained to patient.
13:00:02 Pre-op teaching completed and patient verbalized
understanding.
13:00:05 Is patient on blood thinner?No
13:00:21 PATIENT OFF OF PLAVIX SINCE TUESDAY- PER WIFE
13:00:25 Patient diabetic? Yes.
13:00:26 If diabetic: On Metformin? No
13:00:28 Previous problem with sedation/anesthesia? No ?
13:00:29 Snore? Yes
13:00:45 Sleep apnea? No
13:00:46 Deviated septum? No
13:00:47 Opens mouth fully? Yes
13:00:48 Sticks out tongue? Yes
13:01:58 Baseline sample Acquired.
13:02:56 Oxygen pt on ventilator intubated was administered by
Mary Almaguer RN; Per physician; Verbal order read back
and verified.
13:03:08 Lidocaine 2% 20ml vial added to field was administered
by Saurabh Galan MD; for local anesthetic; Verbal
order read back and verified.
13:03:15 Heparin Flush Bag (1000units/500ml NS) 2 bags added to
field was administered by Saurabh Galan MD; used for
procedure; Verbal order read back and verified.
13:03:25 0.9% NaCl kvo ml/hr I.V. was administered by Mary Almaguer RN; Per physician; Verbal order read back and
 
verified.
13:12:33 Full Disclosure recording started
13:12:37 Vital chart was started
13:12:39 Baseline sample Acquired.
13:13:11 Airway obstruction? Yes ?
13:13:22 Dentures? No ?
13:13:27 Pre procedure: right dorsailis pedis pulse Doppler
13:13:31 Patient pain scale 0/10 ?.
13:13:51 IV patent on arrival in left antecubital with 0.9%
NaCl at KVO.
13:14:02 Right groin area was prepped with chlora-prep and
draped in sterile fashion
13:14:04 Alarms reviewed by R. N.
13:14:04 Sharps counted by scrub and verified by R.N.
13:14:06 --------ALL STOP TIME OUT------
13:14:06 Final Timeout: patient, procedure, and site verified
with staff and physician. All members of the team are
in agreement.
13:14:08 Right groin site verified by team.
13:14:12 Fire Safety Assessment: A--An alcohol-based skin
anteseptic being used preoperatively., C--Open oxygen
or nitrous oxide is being used., D--An ESU, laser, or
fiber-optic light is being used.
13:14:19 Physical assessment completed. ASA score P 4 - A
patient with severe systemic disease that is a
constant threat to life as per Saurabh Galan MD.
13:14:29 2) 60-89 Mildly reduced kidney function, and other
findings (as for stage 1) point to kidney disease.
13:15:06 Maximum allowable contrast dose (3.7 X eGFR X 0.75)169
ml.
13:15:11 Sedation plan: IV Moderate Sedation Medication:Versed,
Fentanyl
13:15:31 Use device set Femoral Dx
13:15:33 ACIST Syringe (85431) opened to sterile field.
13:15:33 Bag Decanter (2002S) opened to sterile field.
13:15:34 Medline Cath Pack ([id number]) opened to sterile field.
13:15:37 ACIST Hand Control (76480) opened to sterile field.
13:15:38 ACIST Manifold (30311) opened to sterile field.
13:15:40 DIAGNOSTIC Multipack 5Fr catheter set ([id number]) opened
to sterile field.
13:15:43 SHEATH 5FR Oran (NLC976) opened to sterile field.
13:15:44 EMERALD Guide Wire (570-578) opened to sterile field.
13:15:50 Procedure started.
13:15:55 Local anesthetic to right femoral artery with
Lidocaine 2% by Saurabh Galan MD.**INITIAL ACCESS
ONLY**
13:16:12 A 5 Fr sheath was inserted into the Right Femoral
artery
13:17:24 Versed 0.5 mg I.V. was administered by Mary Almaguer RN;
for sedation; Verbal order read back and verified.
13:17:27 A MULTIPACK Pigtail 5 Fr catheter was advanced over
the wire and used for Procedure.
13:17:36 LV gram done using MEDRANO
13:17:43 EF : 30 %
13:17:48 Injector settings: Ml/sec: 5, Volume: 15,
13:17:51 Catheter removed.
13:17:58 A MULTIPACK JL 4.0 5Fr catheter was advanced over the
wire and used for Procedure.
13:18:26 LCA angiography performed.
13:19:19 Catheter removed.
 
13:19:28 A MULTIPACK 3DRC 5Fr catheter was advanced over the
wire and used for Procedure.
13:19:32 RCA angiography performed.
13:20:08 Catheter removed.
13:21:17 Proceeding to intervention.
13:21:38 INFLATOR Severo Dumont ([id number]) opened to sterile
field.
13:21:42 Heparin Bolus 4000 units I.V. was administered by
Mary Almaguer RN; for anticoagulation; verified with dr galan Verbal order read back and verified.
13:22:08 CHOICE PT Extra Support 182cm wire ([id number]) opened
to sterile field.
13:22:15 SHEATH 6FR Oran (WFQ943) opened to sterile field.
13:22:17 GUIDE 6FR HS II SH catheter ([id number]) opened to
sterile field.
13:22:32 Sheath upsized to a 6 Fr Short.
13:23:12 Pre PCI Site: Native RCA has 99% stenosis.
13:23:19 6 Fr HSII SH guide catheter was inserted over the wire
13:23:25 CHOICE PT ES wire advanced.
13:23:30 Wire advanced across lesion.
13:23:58 Inflate balloon Inflation number: 1 A EUPHORA 2.0 x 30
Balloon ([id number]) was prepped and advanced across the
Mid RCA , then inflated to 17 AMINAH for 0:00 (min:sec) .
13:24:03 Inflation number: 2 The EUPHORA 2.0 x 30 Balloon
([id number]) was reinflated across the Mid RCA , to 23
AMINAH for 0:00 (min:sec) .
13:24:21 Inflation number: 3 The EUPHORA 2.0 x 30 Balloon
([id number]) was reinflated across the Mid RCA , to 23
AMINAH for 0:00 (min:sec) .
13:24:39 Integrilin (Bolus 2mg/ml) 10.2 ml I.V. was
administered by Mary Almaguer RN; for antiplatelet
therapy; wasted 9.8 ml of vial. Verbal order read back
and verified.
13:25:34 Balloon removed over the wire.
13:26:38 Place stent Inflation Number: 4 A CAMELIA RX 3.0 x 38
stent ([id number]) was prepped and advanced across
the Mid RCA . The stent was deployed at 13 AMINAH for
0:00 (min:sec) .
13:26:45 Stent catheter was removed intact over wire.
13:28:39 Place stent Inflation Number: 5 A CAMELIA RX 2.0 x 15
stent ([id number]) was prepped and advanced across
the Mid RCA . The stent was deployed at 13 AMINAH for
0:00 (min:sec) .
13:29:10 Stent catheter was removed intact over wire.
13:29:11 Wire removed.
13:29:14 Guide catheter removed.
13:29:30 Sheath removed intact; hemostasis achieved with
Exoseal to the Right Femoral artery.
13:30:49 Procedure ended.(Physican Out)
13:30:52 Fluoroscopy time 04.30 minutes.
13:30:56 Flurop Dose total: 435
13:30:56 Fluoroscopy dose: 435 mGy
13:31:06 Dose Area Product 74590 mGy/cm.
13:31:20 Contrast amount:Isovue 300 81ml.
13:31:30 Maximum allowable dose exceeded? No.
13:31:33 Sharps counted by scrub and verified by R.N.
13:32:11 Insertion/operative site no bleeding no hematoma.
13:32:14 Post-op/insertion site Right Femoral artery dressed
using a 4 x 4 and Tegaderm.
13:32:19 Post right femoral artery:stable, soft, clean and dry
 
13:32:23 Post Procedure Pulses reassessed and unchanged
13:32:29 Post procedure: right dorsailis pedis pulse Doppler.
13:32:34 Post-procedure physical assessment completed. ASA
score P 4 - A patient with severe systemic disease
that is a constant threat to life as per Saurabh Galan MD.
13:32:38 Post procedure rhythm: unchanged.
13:32:49 Estimated blood loss: 10 ml
13:32:52 Post procedure instruction explained to
patient.Patient verbalizes understanding.
13:32:53 Patient needs reinforcement of post procedure
teaching.
13:33:23 Plavix 600 mg ngt-crushed was administered by Mary Almaguer RN; for antiplatelet therapy; Verbal order read
back and verified.
13:34:11 Procedure type changed to Cath procedure, Diagnostic
procedure, LHC, LHC w/Coronaries, Sedation Charges,
Moderate Sedation up to 15 minutes, PCI procedure,
Coronary Stent, Coronary Stent Initial, Hemochron ACT
Test
13:35:10 ACT drawn and resulted at out of range high seconds.
(normal therapeutic range 180-240 seconds).
13:36:34 Procedure and supply charges have been captured,
reviewed, submitted and are correct.
13:36:37 Procedure Complication : No complications
13:36:39 Vital chart was stopped
13:36:43 [facility] Findings: MVD- PCI performed (see procedure note)
13:36:44 Operative report dictated upon procedure completion.
13:36:45 See physician's report for complete and final results.
13:36:47 Report given to ICU.
13:36:51 Patient transfered to ICU with Bed.
13:36:53 Procedure ended.
13:36:53 Full Disclosure recording stopped
13:37:03 **ACC-PCI Only** Patient was given prescriptions, or
instructed by Saurabh Galan MD to start/continue the
following medications upon discharge: Plavix
13:37:04 End room use (Document Last)
13:37:15 End room use (Document Last)
13:38:09 End room use (Document Last)
Intervention Summary
Intervention Notes
Time     ActionType  Lesion and  Equipment Used Action#  Pressure  Duration
Attributes
13:23:58 Inflate     Mid RCA     EUPHORA 2.0 x  1        17        00:00
balloon                 30 Balloon
([id number])
13:24:03 Reinflate   Mid RCA     EUPHORA 2.0 x  2        23        00:00
balloon                 30 Balloon
([id number])
13:24:21 Reinflate   Mid RCA     EUPHORA 2.0 x  3        23        00:00
balloon                 30 Balloon
([id number])
13:26:38 Place stent Mid RCA     CAMELIA RX 3.0 x  4        13        00:00
38 stent
([id number])
13:28:39 Place stent Mid RCA     CAMELIA RX 2.0 x  5        13        00:00
15 stent
([id number])
Device Usage
Item Name      Manufacture  Quantity  Catalog Number [facility] Part     Current M
 
inimal Lot# /
Charge   Number   Stock   Stock   Serial#
Code
ACIST Syringe  Acist        1         97764          176882   349372   385528  2
0
(53124)        Medical
Systems Inc
Bag Decanter   Microtek     1                   727949   28928    144384  5
()        Medical Inc.
Medline Cath   Medline      1         [id number]      590040   81984    821136  5
Pack
([id number])
ACIST Hand     Acist        1         89595          585838   852479   731429  5
Control        Medical
(28486)        Systems Inc
ACIST Manifold Acist        1         78839          477969   177102   629889  5
(12813)        Medical
Systems Inc
DIAGNOSTIC     Cardinal     1         KU0915         464653   00683    968274  3
0
Multipack 5Fr  Health
catheter set
(TS3659)
SHEATH 5FR     Terumo       1         BUZ630         545931   733560   824777  5
Oran
(EDS702)
EMERALD Guide  Cardinal     1         502-455        805064   305445   608892  5
Wire (502-455) Health
MULTIPACK      Cardinal     1                                          407991  5
Pigtail 5 Fr   Health
catheter
MULTIPACK JL   Cardinal     1                                          878670  5
4.0 5Fr        Health
catheter
MULTIPACK 3DRC Cardinal     1                                          193554  5
5Fr catheter   Health
INFLATOR Merit Merit        1         [id number]         198985   067597   773982  1
5
Interviu Me
(NV2101)
CHOICE PT      [place]       1         [id number]  051380   006343   436897  5
Extra Support  Scientific
182cm wire
([id number])
SHEATH 6FR     Terumo       1         ZKS630         545259   296166   833528  4
0
Oran
(SEZ924)
GUIDE 6FR HS   Medtronic    1         [id number]      386664   65033    579990  1
II SH catheter
([id number])
EUPHORA 2.0 x  Medtronic    1         [id number]       025489   373876   774724  5
       [id number]
30 Balloon
([id number])
CAMELIA RX 3.0 x  Medtronic    1         [id number]   687555   3006312  277400  5
       [id number]
38 stent
([id number])
CAMELIA RX 2.0 x  Medtronic    1         [id number]   587065   3556136  986749  5
 
       [id number]
15 stent
([id number])
Signature Audit [place]
Stage           Time        Signature      Unsigned
Intra-Procedure 2020   Tereza Jimenez
1:37:15 PM  RN
Intra-Procedure 2020   Mary Almaguer RN
1:38:09 PM
Intra-Procedure 2020   Saurabh Galan
1:38:56 PM  MD
 
 
 
 
 
 
 
 
 
 
 
 
 
 
 
 
 
 
 
 
 
 
 
 
 
 
 
 
 
 
 
 
 
 
 
 
 
 
 
 
 
 
 
 
[id number] [facility] [place] [address]

## 2020-02-26 NOTE — NUR
Received pt to room 2310 via stretcher from the ED. Pt is intubated and
sedated with bilateral wrist restraints on. Attached to monitors all monitors
are on and working. Admission information obtained from family, No s/s of
distress noted. Will continue to monitor.

## 2020-02-26 NOTE — HEMODYNAMI
PATIENT:BALA SCHULTZ                               MEDICAL RECORD: [id number]
: 57                                            LOCATION:[facility]    D.2310
[facility]# [id number]                                       ADMISSION DATE: 20
 
 
 Generatedon:3/4/72223:37
Patient name: BALA SCHULTZ Patient #: [id number] Visit #: [id number] SSN: [id number]
 : 1955
Date of study: 3/4/2020
Page: Of
Hemodynamic Procedure Report
****************************
Patient Data
Patient Demographics
Procedure consent was obtained
First Name: BALA          Gender: Male
Last Name: MARION            : 1955
Middle Initial: WILD       Age: 64 year(s)
Patient #: [id number]       Race: Black
Visit #: [id number]
SSN: 
Accession #:
[id number]
Additional ID: [id number]
Contact details
Address: [address] [place] [address] Phone: [phone]
State: [place]
City: [place]
Zip code: [address]
Past Medical History
Allergies
Allergen    Reaction    Date      Comments
Reported
Other                   2017  Gabapentin
allergy
Other                   3/4/2020  GABAPENTIN,(POLYSULFONE)
allergy
Admission
Admission Data
Admission Date: 2020   Admission Time: 17:33
Arrival Date: 2020     Arrival Time: 0:00
Admit Source: Other         Insurance Payor: Medicare
Room #: D.2310              [facility] #: [id number]
Height (in.): 68            BSA: 2.23 (m2)
Height (cm.): 172.72        BMI: 37.24 (kg/m2)
Weight (lbs.): 244.93
Weight (kg.): 111.1
Lab Results
Lab Result Date: 3/4/2020   Lab Result Time: 0:00
Biochemistry
Name         Units    Result                Min      Max
BUN          mg/dl    43       --(----)-*   7        18
Creatinine   mg/dl    10       --(----)-*   0.6      1.3
eGFR AFRICAN ml/min   7.549812 *-(----)--   90       120
AM
CBC
Name         Units    Result                Min      Max
Hematocrit   %        35       *-(----)--   42       54
Hemoglobin   g/dl     11.5     *-(----)--   13.5     17.5
 
Procedure
Procedure Types
Cath Procedure
Diagnostic Procedure
Sedation Charges
Moderate Sedation up to 15 minutes
PCI Procedure
Coronary Stent
Coronary Stent Initial
Coronary Stent Initial x2
Hemochron ACT Test
Procedure Description
Procedure Date
Procedure Date: 3/4/2020
Procedure Start Time: 9:12
Procedure End Time: 9:36
Procedure Staff
Name                            Function
Saurabh Galan MD                Performing Physician
Pino Adames RN              Nurse
Joselyn Carbone RT                Monitor
Inez Ray RT              Scrub
Procedure Data
Cath Procedure
Fluoroscopy
Diagnostic fluoroscopy      Total fluoroscopy Time: 4
time: 4 min                 min
Diagnostic fluoroscopy      Total fluoroscopy dose: 347
dose: 347 mGy               mGy
Contrast Material
Contrast Material Type                       Amount (ml)
Isovue 370                                   55
Entry Location
Entry     Primary  Successful  Side  Size  Upsize Upsize Entry    Closure Succes
sful  Closure
Location                             (Fr)  1 (Fr) 2 (Fr) Remarks  Device        
      Remarks
Femoral                        Right 6 Fr                         Exoseal
artery                               Short
Estimated blood loss: 10 ml
Procedure Complications
No complications
Procedure Medications
Medication           Administration Route Dosage
0.9% NaCl            I.V.                 10 ml/hr
Oxygen                                    100
Heparin Flush Bag    added to field       2 bags
(1000units/500ml NS)
Lidocaine 2%                              20
Diprivan 1%                               40 mcg/kg/min
(Propofol)
Fentanyl                                  150 mcg/hr
Heparin Bolus        I.V.                 4000 units
Hemodynamics
Rest
BSA: 2.23 (m2) HGB: 11.5 (g/dl) O2 Consumption: Estimated: 252.46 (ml/min) O2 Co
nsumption indexed:
Estimated:113.21 (ml/min/m) Heart Rate: 60 (bpm)
Snapshots
Pre Cath      Intra         NCS           Post Cath
 
Vital Signs
Time    Heart  Resp   SPO2 etCO2   NIBP (mmHg) Rhythm  Pain    Sedation
Rate   (ipm)  (%)  (mmHg)                      Status  Level
(bpm)
9:07:04 62     21     99   0       107/73(90)  NSR     0 (11)  7(A)
, No
pain
9:13:27 61     18     99   0       137/83(83)  NSR     0 (11)  7(A)
, No
pain
9:18:11 60     21     100  0       129/45(89)  NSR     0 (11)  7(A)
, No
pain
9:23:35 60     21     100  0       84/69(81)   NSR     0 (11)  7(A)
, No
pain
9:28:58 59     21     99   0       164/42(106) NSR     0 (11)  7(A)
, No
pain
9:33:57 60     21     99   0       Measuring   NSR     0 (11)  7(A)
, No
pain
9:35:19                    0       Time        NSR     0 (11)  7(A)
Exceeded            , No
pain
Medications
Time    Medication       Route     Dose       Verified Delivered Reason         
 Notes  Effectiveness
by       by
9:00:02 0.9% NaCl        I.V.      10 ml/hr   Pino  Pino   Per physician
Rosangela Adames
RN       RN
9:00:50 Oxygen           OETT-vent 100%       Pino  Pino   for low 02 sats
Rosangela Adames
RN       RN
9:01:02 Heparin Flush    added to  2 bags     Pino  Pnio   used for
Bag              field                Lorigan  Lorigan   procedure
(1000units/500ml                      RN       RN
NS)
9:01:12 Lidocaine 2%               20ml vial  Pino  Pino   for local
Lorigan  Lorigan   anesthetic
RN       RN
9:02:31 Diprivan 1%      I.V. (    40         Pino  Pino   for sedation
(Propofol)       infusing  mcg/kg/min Lorigan  Lorigan
upon                 RN       RN
arrival )
9:03:54 Fentanyl         I.V. (    150mcg/hr  Pino  Pino   for sedation
infusing             Lorigan  Lorigan
upon                 RN       RN
arrival )
9:16:11 Heparin Bolus    I.V.      4000 units Pino  Pino   for
Lorigan  Lorigan   anticoagulation
RN       RN
Procedure Log
Time     Note
8:21:04  Informed consent obtained and on chart
8:21:11  Patient Weight : 244.93 lbs
8:21:11  Patient Height : 68 inches
8:23:33  Lab Result : Hematocrit 35 %
8:23:33  Lab Result : eGFR AFRICAN AM 7.761846 ml/min
 
8:23:33  Lab Result : Hemoglobin 11.5 g/dl
8:23:33  Lab Result : BUN 43 mg/dl
8:23:33  Lab Result : Creatinine 10 mg/dl
8:30:40  Procedure Status Urgent Heart Cath (IP).
8:30:46  Time tracking: Regular hours (M-F 7:00 - 5:00)
8:30:56  Plan of Care:Hemodynamics will remain stable., Cardiac
rhythm will remain stable., Comfort level will be
maintained., Respiratory function will remain
adequate., Patient/ family verbilizes understanding of
procedure., Procedure tolerated without complication.,
Recovers from procedure without complications..
8:31:14  H&P Date Dictated: 3/4/2020 Within 30 days and on
chart..
8:31:26  Patient NPO since Midnight.
8:32:18  Patient allergic to Other
allergyGABAPENTIN,(POLYSULFONE)
8:34:04  Pino Adames RN sent for patient. Start room use.
8:38:15  Risk of Mortality: 0.9
8:38:20  Risk of blood transfusion: 1.9
8:38:25  Risk of LUANA: 23.0
8:38:35  Lab results completed and on chart.
8:45:05  Patient received from ICU to CCL 1 On ventilator.
Tansferred to table in Supine position.
8:45:07  Warm blankets applied, and kemal hugger turned on for
patient comfort.
8:45:08  Correct patient and procedure confirmed by team.
8:45:09  ECG and BP/O2 sat monitors applied to patient.
8:45:31  **ACC** Patient presents with Stable Angina CCS
Anginal Class 4--Inability to carry out any physical
activity w/o angina. Angina may occur at rest.
8:56:10  Diagnostic Cath Status : Urgent
8:59:19  Vital chart was started
8:59:21  Full Disclosure recording started
8:59:22  Pre-procedure instructions explained to patient.
8:59:23  Pre-op teaching completed and patient verbalized
understanding.
8:59:25  Family in patients room.
8:59:27  Is the patient allergic to Iodine/contrast media? No.
8:59:32  Was the patient premedicated? N/A
8:59:35  Is patient on blood thinner?Yes
8:59:39  **ACC** The patient was administered the following
blood thiners within the last 24 hours: **ACC**Plavix
8:59:46  Patient diabetic? No.
8:59:49  If diabetic: On Metformin? N/A
8:59:51  ----Pre-sedation anethsthesia assessment.----
8:59:56  Previous problem with sedation/anesthesia? No ?
8:59:57  Snore? Yes
8:59:59  Sleep apnea? No
9:00:00  Deviated septum? No
9:00:02  0.9% NaCl 10 ml/hr I.V. was administered by Pino Adames RN; Per physician; Verbal order read back and
verified.
9:00:03  Opens mouth fully? No
9:00:05  Sticks out tongue? No
9:00:07  Airway obstruction? No ?
9:00:09  Dentures? No ?
9:00:22  IV patent on arrival in left antecubital with 0.9%
NaCl at [facility].
9:00:48  Patient pain scale 0/10 SEDATED/INTABATED.
9:00:50  Oxygen 100% OETT-vent was administered by Pino Adames RN; for low 02 sats; Verbal order read back
and verified.
9:00:51  Alarms reviewed by R. N.
9:00:51  Sharps counted by scrub and verified by R.N.
9:00:57  Right groin area was prepped with chlora-prep and
draped in sterile fashion
9:01:02  Heparin Flush Bag (1000units/500ml NS) 2 bags added to
field was administered by Pino Adames RN; used for
procedure; Verbal order read back and verified.
9:01:12  Lidocaine 2% 20ml vial was administered by Pino Adames RN; for local anesthetic; Verbal order read
back and verified.
9:02:01  Physician paged
9:02:05  Use device set Femoral Dx
9:02:06  ACIST Syringe (27726) opened to sterile field.
9:02:14  Bag Decanter (2002S) opened to sterile field.
9:02:14  Medline Cath Pack ([id number]) opened to sterile field.
9:02:16  ACIST Hand Control (44139) opened to sterile field.
9:02:17  ACIST Manifold (31762) opened to sterile field.
9:02:19  EMERALD Guide Wire (502-513) opened to sterile field.
9:02:20  DIAGNOSTIC Multipack 5Fr catheter set ([id number]) opened
to sterile field.
9:02:25  Use device set TAUTH PCI
9:02:30  INFLATOR Merit BasixCompak ([id number]) opened to sterile
field.
9:02:31  Diprivan 1% (Propofol) 40 mcg/kg/min I.V. ( infusing
upon arrival ) was administered by Pino Adames RN;
for sedation; Verbal order read back and verified.
9:02:39  SHEATH 6FR [place] (VZX975) opened to sterile field.
9:03:54  Fentanyl 150mcg/hr I.V. ( infusing upon arrival ) was
administered by Pino Adames RN; for sedation;
Verbal order read back and verified.
9:06:05  Rhythm: sinus bradycardia
9:06:13  Baseline sample Acquired.
9:07:22  Physician arrived
9:07:52  --------ALL STOP TIME OUT------
9:07:53  Final Timeout: patient, procedure, and site verified
with staff and physician. All members of the team are
in agreement.
9:07:54  Right groin site verified by team.
9:07:57  Fire Safety Assessment: A--An alcohol-based skin
anteseptic being used preoperatively., C--Open oxygen
or nitrous oxide is being used., D--An ESU, laser, or
fiber-optic light is being used.
9:08:03  Physical assessment completed. ASA score P 2 - A
patient with mild systemic disease as per Saurabh Galan MD.
9:08:09  4) 15-29 Severley reduced kidney function.
9:08:12  Maximum allowable contrast dose (3.7 X eGFR X 0.75)19
ml.
9:08:17  Sedation plan: IV Moderate Sedation Medication:Versed,
Fentanyl
9:12:44  Procedure started.
9:12:48  Local anesthetic to right femoral artery with
Lidocaine 2% by Saurabh Galan MD.**INITIAL ACCESS
ONLY**
9:12:55  GUIDE 6FR XBLAD 4.0 catheter ([id number]) opened to
sterile field.
9:12:57  GRAPHIX 182cm guide wire ([id number]) opened to sterile
field.
 
9:12:57  GRAPHIX 182cm guide wire ([id number]) opened to sterile
field.
9:13:21  A 6 Fr Short sheath was inserted into the Right
Femoral artery
9:13:40  Proceeding to intervention.
9:13:47  6 Fr XBLAD 4 guide catheter was inserted over the wire
9:16:11  Heparin Bolus 4000 units I.V. was administered by
Pino Adames RN; for anticoagulation; Verbal order
read back and verified.
9:16:25  PT GRAPHIX 182 wire advanced.
9:16:37  2ND GRAPHIX FOR BENNY IN CIRC .
9:16:56  Pre PCI Site: Native pLAD has 90% stenosis.
9:18:07  Wire advanced across lesion.
9:18:26  GUIDE 6FR XBLAD 3.5 catheter ([id number]) opened to
sterile field.
9:19:09  Place stent Inflation Number: 1 A CAMELIA RX 3.0 x 15
stent ([id number]) was prepped and advanced across
the Prox LAD . The stent was deployed at 21 AMINAH for
0:00 (min:sec) .
9:19:58  Stent catheter was removed intact over wire.
9:20:16  Pre PCI Site: Native Circ has 80% stenosis.
9:21:45  Place stent Inflation Number: 1 A CAMELIA RX 3.0 x 08
stent ([id number]) was prepped and advanced across
the Prox CX . The stent was deployed at 19 AMINAH for
0:00 (min:sec) .
9:21:54  Stent catheter was removed intact over wire.
9:21:59  Pre PCI Site: Native LMCA has 90% stenosis.
9:22:41  Wire redirected to LMCA.
9:22:55  BENNY WIRE REMOVED.
9:23:29  Inflation number: 1 The stent balloon was then
re-inflated across the LMCA to 19 AMINAH for 0:00
(min:sec) .
9:23:35  Stent catheter was removed intact over wire.
9:24:33  Place stent Inflation Number: 2 A CAMELIA RX 3.5 x 12
stent ([id number]) was prepped and advanced across
the LMCA . The stent was deployed at 13 AMINAH for 0:00
(min:sec) .
9:25:29  Stent catheter was removed intact over wire.
9:25:30  Wire removed.
9:25:31  Guide catheter removed.
9:25:34  EXOSEAL 6Fr () opened to sterile field.
9:25:52  Sheath removed intact; hemostasis achieved with
Exoseal to the Right Femoral artery.
9:26:15  Procedure ended.(Physican Out)
9:26:47  Fluoroscopy time 04.00 minutes.
9::52  Fluoroscopy dose: 347 mGy
9::52  Flurop Dose total: 347
9:26:57  Dose Area Product 29240 mGy/cm.
9:27:17  Contrast amount:Isovue 370 55ml.
9:27:23  Maximum allowable dose exceeded? Yes.
9:27:24  Sharps counted by scrub and verified by R.N.
9:27:40  Post-op/insertion site Right Femoral artery dressed
using a 4 x 4 and Tegaderm.
9:27:45  Post right femoral artery:stable, soft, clean and dry
9:27:47  Post Procedure Pulses reassessed and unchanged
9:28:12  Post-procedure physical assessment completed. ASA
score P 2 - A patient with mild systemic disease as
per Saurabh Galan MD.
9:28:16  Post procedure rhythm: unchanged.
9:28:18  Estimated blood loss: 10 ml
 
9:28:20  Post procedure instruction explained to
patient.Patient verbalizes understanding.
9:28:21  Patient needs reinforcement of post procedure
teaching.
9:29:53  Procedure type changed to Cath procedure, Diagnostic
procedure, Sedation Charges, Moderate Sedation up to
15 minutes, PCI procedure, Coronary Stent, Coronary
Stent Initial, Coronary Stent Initial x2, Hemochron
ACT Test
9:29:58  ACT drawn and resulted at 210 seconds. (normal
therapeutic range 180-240 seconds).
9:31:15  Procedure and supply charges have been captured,
reviewed, submitted and are correct.
9:31:17  [facility] Findings: MVD- PCI performed (see procedure note)
9:31:18  Operative report dictated upon procedure completion.
9:31:19  See physician's report for complete and final results.
9:31:22  Report given to ICU.
9:31:26  Patient transfered to ICU with Bed.
9:31:49  Procedure Complication : No complications
9:31:58  **ACC-PCI Only** Patient was given prescriptions, or
instructed by Saurabh Galan MD to start/continue the
following medications upon discharge: Plavix
9:36:23  Vital chart was stopped
9:36:46  Procedure ended.
9:36:46  Full Disclosure recording stopped
9:36:52  End room use (Document Last)
9:37:05  End room use (Document Last)
9:37:29  End room use (Document Last)
Intervention Summary
Intervention Notes
Time    ActionType  Lesion and  Equipment Used Action#  Pressure  Duration
Attributes
9:19:09 Place stent Prox LAD    CAMELIA RX 3.0 x  1        21        00:00
15 stent
([id number])
9:21:45 Place stent Prox CX     CAMELIA RX 3.0 x  1        19        00:00
08 stent
([id number])
9:23:29 Reinflate   LMCA        CAMELIA RX 3.0 x  1        19        00:00
stent                   08 stent
balloon                 ([id number])
9:24:33 Place stent LMCA        CAMELIA RX 3.5 x  2        13        00:00
12 stent
([id number])
Device Usage
Item Name      Manufacture  Quantity  Catalog Number [facility] Part     Current M
inimal Lot# /
Charge   Number   Stock   Stock   Serial#
Code
ACIST Syringe  Acist        1         48760          527690   163005   127010  2
0
(25562)        Medical
Systems Inc
Bag Decanter   Microtek     1                   620768   55531    331575  5
()        Medical Inc.
Medline Cath   Medline      1         [id number]      309955   87621    522068  5
Pack
([id number])
ACIST Hand     Acist        1         83595          564150   553820   911593  5
Control        Medical
 
(58498)        Systems Inc
ACIST Manifold Acist        1         17448          608798   405479   490291  5
(69298)        Medical
Systems Inc
EMERALD Guide  Cardinal     1         502-455        952124   641704   989264  5
Wire (502-455) Health
DIAGNOSTIC     Cardinal     1         TR3840         999718   12121    562544  3
0
Multipack 5Fr  Health
catheter set
([id number])
INFLATOR Merit Merit        1         II6498         975718   549137   747515  1
5
Axial Exchange
(NV1077)
SHEATH 6FR     Terumo       1         JLI254         183238   165891   380972  4
0
[place]
(FTV961)
GUIDE 6FR      Cardinal     1         [id number]       973859   914076   177935  3
XBLAD 4.0      Health
catheter
([id number])
GRAPHIX 182cm  [place]       2         [id number]  712799   243296   940098  5
guide wire     Scientific
([id number])
GUIDE 6FR      Cardinal     1         02137294       543200   243601   870803  1
0
XBLAD 3.5      Health
catheter
(18867971)
CAMELIA RX 3.0 x  Medtronic    1         [id number]   250032   7126378  258403  5
       [id number]
15 stent
([id number])
CAMELIA RX 3.0 x  Medtronic    1         [id number]   383449   1076640  615766  5
       [id number]
08 stent
([id number])
CAMELIA RX 3.5 x  Medtronic    1         [id number]   904245   1521404  139305  5
       [id number]
12 stent
([id number])
EXOSEAL 6Fr    Cardinal     1                   897787   879727   475076  1
0
()        Health
Signature Audit Sundown
Stage           Time        Signature      Unsigned
Intra-Procedure 3/4/2020    Joselyn Carbone
9:37:05 AM  RT(R)
Intra-Procedure 3/4/2020    Pino
9:37:29 AM  Rosangela FLYNN
Intra-Procedure 3/4/2020    Saurabh Galan
9:37:45 AM  MD
 
[id number] [place] [address]

## 2020-02-26 NOTE — NUR
Reassessment completed, see flowsheet for details. Pt is laying in bed
intubated and sedated. No needs noted. No s/s of distress. Repositioned for
comfort. Oral care performed. Will continue to monitor.

## 2020-02-26 NOTE — NUR
Pt is resting in bed intubated and sedated. Repositioned for comfort. Oral
care performed. No s/s of distress. Will continue to monitor.

## 2020-02-27 VITALS — DIASTOLIC BLOOD PRESSURE: 46 MMHG | SYSTOLIC BLOOD PRESSURE: 122 MMHG

## 2020-02-27 VITALS — DIASTOLIC BLOOD PRESSURE: 69 MMHG | SYSTOLIC BLOOD PRESSURE: 139 MMHG

## 2020-02-27 VITALS — SYSTOLIC BLOOD PRESSURE: 105 MMHG | DIASTOLIC BLOOD PRESSURE: 52 MMHG

## 2020-02-27 VITALS — DIASTOLIC BLOOD PRESSURE: 49 MMHG | SYSTOLIC BLOOD PRESSURE: 122 MMHG

## 2020-02-27 VITALS — SYSTOLIC BLOOD PRESSURE: 110 MMHG | DIASTOLIC BLOOD PRESSURE: 34 MMHG

## 2020-02-27 VITALS — DIASTOLIC BLOOD PRESSURE: 49 MMHG | SYSTOLIC BLOOD PRESSURE: 126 MMHG

## 2020-02-27 VITALS — SYSTOLIC BLOOD PRESSURE: 137 MMHG | DIASTOLIC BLOOD PRESSURE: 97 MMHG

## 2020-02-27 VITALS — DIASTOLIC BLOOD PRESSURE: 80 MMHG | SYSTOLIC BLOOD PRESSURE: 109 MMHG

## 2020-02-27 VITALS — SYSTOLIC BLOOD PRESSURE: 165 MMHG | DIASTOLIC BLOOD PRESSURE: 95 MMHG

## 2020-02-27 VITALS — DIASTOLIC BLOOD PRESSURE: 101 MMHG | SYSTOLIC BLOOD PRESSURE: 173 MMHG

## 2020-02-27 VITALS — SYSTOLIC BLOOD PRESSURE: 108 MMHG | DIASTOLIC BLOOD PRESSURE: 38 MMHG

## 2020-02-27 VITALS — DIASTOLIC BLOOD PRESSURE: 96 MMHG | SYSTOLIC BLOOD PRESSURE: 159 MMHG

## 2020-02-27 VITALS — DIASTOLIC BLOOD PRESSURE: 66 MMHG | SYSTOLIC BLOOD PRESSURE: 128 MMHG

## 2020-02-27 VITALS — DIASTOLIC BLOOD PRESSURE: 49 MMHG | SYSTOLIC BLOOD PRESSURE: 89 MMHG

## 2020-02-27 VITALS — SYSTOLIC BLOOD PRESSURE: 123 MMHG | DIASTOLIC BLOOD PRESSURE: 74 MMHG

## 2020-02-27 VITALS — DIASTOLIC BLOOD PRESSURE: 81 MMHG | SYSTOLIC BLOOD PRESSURE: 135 MMHG

## 2020-02-27 VITALS — SYSTOLIC BLOOD PRESSURE: 113 MMHG | DIASTOLIC BLOOD PRESSURE: 72 MMHG

## 2020-02-27 VITALS — DIASTOLIC BLOOD PRESSURE: 29 MMHG | SYSTOLIC BLOOD PRESSURE: 117 MMHG

## 2020-02-27 VITALS — SYSTOLIC BLOOD PRESSURE: 118 MMHG | DIASTOLIC BLOOD PRESSURE: 54 MMHG

## 2020-02-27 VITALS — SYSTOLIC BLOOD PRESSURE: 119 MMHG | DIASTOLIC BLOOD PRESSURE: 57 MMHG

## 2020-02-27 VITALS — DIASTOLIC BLOOD PRESSURE: 95 MMHG | SYSTOLIC BLOOD PRESSURE: 145 MMHG

## 2020-02-27 VITALS — SYSTOLIC BLOOD PRESSURE: 109 MMHG | DIASTOLIC BLOOD PRESSURE: 56 MMHG

## 2020-02-27 VITALS — DIASTOLIC BLOOD PRESSURE: 45 MMHG | SYSTOLIC BLOOD PRESSURE: 132 MMHG

## 2020-02-27 VITALS — SYSTOLIC BLOOD PRESSURE: 146 MMHG | DIASTOLIC BLOOD PRESSURE: 84 MMHG

## 2020-02-27 LAB
ANION GAP SERPL CALC-SCNC: 17.7 MMOL/L (ref 8–16)
BUN SERPL-MCNC: 32 MG/DL (ref 7–18)
CALCIUM SERPL-MCNC: 8.6 MG/DL (ref 8.5–10.1)
CHLORIDE SERPL-SCNC: 102 MMOL/L (ref 98–107)
CK MB SERPL-MCNC: 2.9 U/L (ref 0–3.6)
CK MB SERPL-MCNC: 3.1 U/L (ref 0–3.6)
CK MB SERPL-MCNC: 3.7 U/L (ref 0–3.6)
CK SERPL-CCNC: 1206 UL (ref 21–232)
CK SERPL-CCNC: 1435 UL (ref 21–232)
CK SERPL-CCNC: 1926 UL (ref 21–232)
CO2 SERPL-SCNC: 23.3 MMOL/L (ref 21–32)
CREAT SERPL-MCNC: 7.8 MG/DL (ref 0.6–1.3)
ERYTHROCYTE [DISTWIDTH] IN BLOOD BY AUTOMATED COUNT: 15.7 % (ref 11.5–14.5)
GLUCOSE SERPL-MCNC: 64 MG/DL (ref 74–106)
HCT VFR BLD CALC: 39.7 % (ref 42–54)
HGB BLD-MCNC: 12.9 G/DL (ref 13.5–17.5)
LYMPHOCYTES NFR BLD AUTO: 12 % (ref 15–50)
MCH RBC QN AUTO: 30.5 PG (ref 26–34)
MCHC RBC AUTO-ENTMCNC: 32.5 G/DL (ref 31–37)
MCV RBC: 93.9 FL (ref 80–100)
MONOCYTES NFR BLD: 10 % (ref 2–11)
NEUTROPHILS NFR BLD AUTO: 78 % (ref 40–80)
OSMOLALITY SERPL CALC.SUM OF ELEC: 282 MOSM/KG (ref 275–300)
PLATELET # BLD EST: NORMAL 10*3/UL
PLATELET # BLD: 201 10X3/UL (ref 130–400)
PMV BLD AUTO: 10.9 FL (ref 7.4–10.4)
POTASSIUM SERPL-SCNC: 4 MMOL/L (ref 3.5–5.1)
RBC # BLD AUTO: 4.23 10X6/UL (ref 4.2–6.1)
SODIUM SERPL-SCNC: 139 MMOL/L (ref 136–145)
TROPONIN I SERPL-MCNC: 0.12 NG/ML (ref 0–0.06)
TROPONIN I SERPL-MCNC: 0.2 NG/ML (ref 0–0.06)
TROPONIN I SERPL-MCNC: 0.22 NG/ML (ref 0–0.06)
WBC # BLD AUTO: 5 10X3/UL (ref 4.8–10.8)

## 2020-02-27 NOTE — NUR
Pt is laying in bed intubated and sedated. Repositioned for comfort. Oral care
performed. No further needs noted. No s/s of distress noted. Will continue to
monitor.

## 2020-02-27 NOTE — NUR
UP IN BED ON VENT AT THIS TIME. VSS. NO ACUTE DISTRESS NOTED. PT OPENS EYES
AND FOLLOWS COMMANDS. TURNED Q2H. ORAL CARE PROVIDED Q2H. WILL CONTINUE PLAN
OF CARE.

## 2020-02-27 NOTE — MORECARE
CASE MANAGEMENT DISCHARGE SUMMARY
 
 
PATIENT: BALA SCHULTZ                  UNIT: [id number]
ACCOUNT#: [id number]                       ADM DATE: 20
AGE: 64     : [birth year]  SEX: M            ROOM/BED: D.2310    
AUTHOR: SOHEILA,DOC                             PHYSICIAN:                               
 
REFERRING PHYSICIAN: BRANDON HILTON MD            
DATE OF SERVICE: 20
Discharge Plan
 
 
Patient Name: BALA SCHULTZ
Facility: [facility]
Encounter #: [id number]
Medical Record #: [id number]
: 1955
Planned Disposition: Home
Anticipated Discharge Date: 
 
Discharge Date: 
Expected LOS: 
Initial Reviewer: [id number]
Initial Review Date: 2020
Generated: 20   7:05 pm 
Comments
 
DCP- Discharge Planning
 
Updated by [id number]: Naty Stanley on 20   4:59 pm CT
Patient Name: BALA SCHULTZ                                     
Admission Status: ER   
Accout number: [id number]                              
Admission Date: 2020   
: 1955                                                        
Admission Diagnosis:   
Attending: BRANDON HILTON                                                
Current LOS:  1   
  
Anticipated DC Date:    
Planned Disposition: Home   
Primary Insurance: MEDICARE A & B   
  
  
Discharge Planning Comments: CM met with patient's daughter to complete 
initial dc planning assessment.  CM educated patient on the CM role and 
verbal consent given by patient to complete assessment. Patient is currently 
sedated on vent.  CM verified patient's address, phone number, and emergency 
contact phone numbers.  Patient lives at home with his spouse.  At discharge 
 
patient plans to return home and feels that she may require some assistance.  
CM discussed availability of home health, rehab services, and medical 
equipment. Patient's daughter is uncertain of patient's medical equipment at 
home. Patient does have hemodialysis MWF in [place] @ 0600.  CM will continue 
to follow and will assist as needed with dc plans/needs.    
  
  
  
  
  
  
: Naty Stanley
 DCPIA - Discharge Planning Initial Assessment
 
Updated by [id number]: Naty Stanley on 20   5:26 pm
*  Is the patient Alert and Oriented?
Yes
*  How many steps to enter\exit or inside your home?
 
*  PCP
[facility]
*  [facility]
*  Preadmission Environment
Home with Family
*  ADLs
Independent
*  Equipment
None
*  Other Equipment
UNCERTAIN?
*  List name and contact numbers for known caregivers / representatives who 
currently or will assist patient after discharge:
GUMARO SCHULTZ - SPOUSE - [phone]
*  Verbal permission to speak to the caregivers and representatives has been 
obtained from the patient.
Yes
*  Community resources currently utilized
None
*  Additional services required to return to the preadmission environment?
No
*  Can the patient safely return to the preadmission environment?
Yes
*  Has this patient been hospitalized within the prior 30 days at any 
hospital?
No
 
 
 
 
 
 
 
 
Last DP export: 20   4:30 p
Patient Name: BALA SCHULTZ
Encounter #: [id number]
Page [id number]
 
 
 
 
 
Electronically Signed by KEVIN PARNELL on 20 at 1806
 
 
 
 
 
 
**All edits/amendments must be made on the electronic document**
 
DICTATION DATE: 20     : JAG  20     
RPT#: [id number]                                DC DATE:        
                                               STATUS: ADM IN  
[facility]
 [place] [address]
***END OF REPORT***

## 2020-02-27 NOTE — NUR
1230: BLOOD SUGAR CHECK NOTED AT 47 THIS TIME; PRN/PROTOCOL 1/2 AMP D50 ADMIN.
1248: BS RECHECK NOTED AT 83. WILL REASSESS IN 30 MIN TO SEE IF BS HOLDS. VSS.
PT FAMILY AT BEDSIDE. PT WAKES UP AND FOLLOWS COMMANDS. WILL CONTINUE PLAN OF
CARE.

## 2020-02-27 NOTE — NUR
CHG BATH PROVIDED AT THIS TIME WITH TOTAL LINEN CHANGE. VSS. NO ACUTE DISTRESS
NOTED. WILL CONTINUE PLAN OF CARE.

## 2020-02-27 NOTE — NUR
STILL NO CALLBACK FROM DR BELTRAN, OFFICE CALLED, THEY STATED DR HILTON IS ON
CALL; PHYSICIAN PAGED. WAITING FOR CALLBACK.

## 2020-02-27 NOTE — NUR
Reassessment completed, see flowsheet for details. Pt is laying in bed
intubated and sedated. Repositioned for comfort. Oral care performed. No s/s
of distress noted. Will continue to monitor.

## 2020-02-27 NOTE — NUR
NOTED APPEARED TO HAVE SOME ST ELEVATION TO TELEMETRY, PER ORDERS EKG OBTAINED
AND CARIDAC ENZYMES ORDERED.

## 2020-02-27 NOTE — NUR
FAMILY AT BEDSIDE AT THIS TIME. VSS. CONSENTS SIGNED. PTS FAMILY DENIES ANY
FURTHER QUESTIONS OR CONCERNS AT THIS GIVEN TIME. WILL CONTINUE PLAN OF CARE.

## 2020-02-27 NOTE — NUR
NO ACUTE DISTRESS NOTED AT THIS TIME. VSS. PT WAKES UP, FOLLOWS COMMANDS, AND
ANSWERS YES/NO QUESTIONS. PT DENIES ANY DISCOMFORTS. VSS. WILL CONTINUE PLAN
OF CARE.

## 2020-02-27 NOTE — MORECARE
CASE MANAGEMENT DISCHARGE SUMMARY
 
 
PATIENT: BALA SCHULTZ                  UNIT: [id number]
ACCOUNT#: [id number]                       ADM DATE: 20
AGE: 64     : 57  SEX: M            ROOM/BED: [id number].2310    
AUTHOR: KEVIN PARNELL                             PHYSICIAN:                               
 
REFERRING PHYSICIAN: BRANDON HILTON MD            
DATE OF SERVICE: 20
Discharge Plan
 
 
Patient Name: BALA SCHULTZ
[facility]
Encounter #: [id number]
Medical Record #: [id number]
: 1955
Planned Disposition: Home
Anticipated Discharge Date: 
 
Discharge Date: 
Expected LOS: 
Initial Reviewer: [id number]
Initial Review Date: 2020
Generated: 20   6:29 pm 
 DCPIA - Discharge Planning Initial Assessment
 
Updated by [email or web address]: Naty Stanley on 20   5:26 pm
*  Is the patient Alert and Oriented?
Yes
*  How many steps to enter\exit or inside your home?
 
*  PCP
[facility]
*  Ph[facility]acy
[facility]
*  Preadmission Environment
Home with Family
*  ADLs
Independent
*  Equipment
None
*  Other Equipment
UNCERTAIN?
*  List name and contact numbers for known caregivers / representatives who 
currently or will assist patient after discharge:
GUMARO SCHULTZ - SPOUSE - [phone]
*  Verbal permission to speak to the caregivers and representatives has been 
obtained from the patient.
 
Yes
*  Community resources currently utilized
None
*  Additional services required to return to the preadmission environment?
No
*  Can the patient safely return to the preadmission environment?
Yes
*  Has this patient been hospitalized within the prior 30 days at any 
hospital?
No
 
 
 
 
 
 
Patient Name: BALA SCHULTZ
Encounter #: [id number]
Page [id number]
 
 
 
 
 
Electronically Signed by KEVIN PARNELL on 20 at 1730
 
 
 
 
 
 
**All edits/amendments must be made on the electronic document**
 
DICTATION DATE: 20     : JAG  20     
RPT#: [id number]                                DC DATE:        
                                               STATUS: ADM IN  
[facility]
 [place] [address]
***END OF REPORT***

## 2020-02-28 VITALS — SYSTOLIC BLOOD PRESSURE: 124 MMHG | DIASTOLIC BLOOD PRESSURE: 52 MMHG

## 2020-02-28 VITALS — SYSTOLIC BLOOD PRESSURE: 101 MMHG | DIASTOLIC BLOOD PRESSURE: 48 MMHG

## 2020-02-28 VITALS — DIASTOLIC BLOOD PRESSURE: 57 MMHG | SYSTOLIC BLOOD PRESSURE: 132 MMHG

## 2020-02-28 VITALS — SYSTOLIC BLOOD PRESSURE: 99 MMHG | DIASTOLIC BLOOD PRESSURE: 38 MMHG

## 2020-02-28 VITALS — DIASTOLIC BLOOD PRESSURE: 10 MMHG | SYSTOLIC BLOOD PRESSURE: 130 MMHG

## 2020-02-28 VITALS — SYSTOLIC BLOOD PRESSURE: 111 MMHG | DIASTOLIC BLOOD PRESSURE: 54 MMHG

## 2020-02-28 VITALS — SYSTOLIC BLOOD PRESSURE: 105 MMHG | DIASTOLIC BLOOD PRESSURE: 98 MMHG

## 2020-02-28 VITALS — DIASTOLIC BLOOD PRESSURE: 37 MMHG | SYSTOLIC BLOOD PRESSURE: 115 MMHG

## 2020-02-28 VITALS — SYSTOLIC BLOOD PRESSURE: 91 MMHG | DIASTOLIC BLOOD PRESSURE: 61 MMHG

## 2020-02-28 VITALS — DIASTOLIC BLOOD PRESSURE: 42 MMHG | SYSTOLIC BLOOD PRESSURE: 90 MMHG

## 2020-02-28 VITALS — DIASTOLIC BLOOD PRESSURE: 32 MMHG | SYSTOLIC BLOOD PRESSURE: 90 MMHG

## 2020-02-28 VITALS — DIASTOLIC BLOOD PRESSURE: 37 MMHG | SYSTOLIC BLOOD PRESSURE: 91 MMHG

## 2020-02-28 VITALS — SYSTOLIC BLOOD PRESSURE: 93 MMHG | DIASTOLIC BLOOD PRESSURE: 57 MMHG

## 2020-02-28 VITALS — DIASTOLIC BLOOD PRESSURE: 71 MMHG | SYSTOLIC BLOOD PRESSURE: 101 MMHG

## 2020-02-28 VITALS — SYSTOLIC BLOOD PRESSURE: 100 MMHG | DIASTOLIC BLOOD PRESSURE: 34 MMHG

## 2020-02-28 VITALS — DIASTOLIC BLOOD PRESSURE: 45 MMHG | SYSTOLIC BLOOD PRESSURE: 95 MMHG

## 2020-02-28 VITALS — DIASTOLIC BLOOD PRESSURE: 47 MMHG | SYSTOLIC BLOOD PRESSURE: 122 MMHG

## 2020-02-28 VITALS — DIASTOLIC BLOOD PRESSURE: 51 MMHG | SYSTOLIC BLOOD PRESSURE: 102 MMHG

## 2020-02-28 VITALS — DIASTOLIC BLOOD PRESSURE: 40 MMHG | SYSTOLIC BLOOD PRESSURE: 118 MMHG

## 2020-02-28 VITALS — SYSTOLIC BLOOD PRESSURE: 102 MMHG | DIASTOLIC BLOOD PRESSURE: 66 MMHG

## 2020-02-28 VITALS — DIASTOLIC BLOOD PRESSURE: 29 MMHG | SYSTOLIC BLOOD PRESSURE: 105 MMHG

## 2020-02-28 VITALS — DIASTOLIC BLOOD PRESSURE: 37 MMHG | SYSTOLIC BLOOD PRESSURE: 104 MMHG

## 2020-02-28 VITALS — DIASTOLIC BLOOD PRESSURE: 52 MMHG | SYSTOLIC BLOOD PRESSURE: 114 MMHG

## 2020-02-28 VITALS — DIASTOLIC BLOOD PRESSURE: 51 MMHG | SYSTOLIC BLOOD PRESSURE: 118 MMHG

## 2020-02-28 VITALS — DIASTOLIC BLOOD PRESSURE: 74 MMHG | SYSTOLIC BLOOD PRESSURE: 90 MMHG

## 2020-02-28 VITALS — DIASTOLIC BLOOD PRESSURE: 61 MMHG | SYSTOLIC BLOOD PRESSURE: 120 MMHG

## 2020-02-28 VITALS — SYSTOLIC BLOOD PRESSURE: 94 MMHG | DIASTOLIC BLOOD PRESSURE: 56 MMHG

## 2020-02-28 VITALS — DIASTOLIC BLOOD PRESSURE: 55 MMHG | SYSTOLIC BLOOD PRESSURE: 110 MMHG

## 2020-02-28 VITALS — DIASTOLIC BLOOD PRESSURE: 45 MMHG | SYSTOLIC BLOOD PRESSURE: 96 MMHG

## 2020-02-28 LAB
ALBUMIN SERPL ELPH-MCNC: 3.1 G/DL (ref 2.9–4.4)
ALBUMIN/GLOB SERPL: 0.9 {RATIO} (ref 0.7–1.7)
ALPHA1 GLOB SERPL ELPH-MCNC: 0.2 G/DL (ref 0–0.4)
ALPHA2 GLOB SERPL ELPH-MCNC: 0.7 G/DL (ref 0.4–1)
ANION GAP SERPL CALC-SCNC: 17.1 MMOL/L (ref 8–16)
ANION GAP SERPL CALC-SCNC: 20.5 MMOL/L (ref 8–16)
B-GLOBULIN SERPL ELPH-MCNC: 1.2 G/DL (ref 0.7–1.3)
BUN SERPL-MCNC: 49 MG/DL (ref 7–18)
BUN SERPL-MCNC: 52 MG/DL (ref 7–18)
CALCIUM SERPL-MCNC: 8.1 MG/DL (ref 8.5–10.1)
CALCIUM SERPL-MCNC: 8.2 MG/DL (ref 8.5–10.1)
CHLORIDE SERPL-SCNC: 100 MMOL/L (ref 98–107)
CHLORIDE SERPL-SCNC: 100 MMOL/L (ref 98–107)
CK MB SERPL-MCNC: 2.5 U/L (ref 0–3.6)
CK SERPL-CCNC: 1720 UL (ref 21–232)
CO2 SERPL-SCNC: 21.9 MMOL/L (ref 21–32)
CO2 SERPL-SCNC: 24.5 MMOL/L (ref 21–32)
CREAT SERPL-MCNC: 10.3 MG/DL (ref 0.6–1.3)
CREAT SERPL-MCNC: 9.8 MG/DL (ref 0.6–1.3)
EOSINOPHIL NFR BLD: 1 % (ref 0–7)
ERYTHROCYTE [DISTWIDTH] IN BLOOD BY AUTOMATED COUNT: 15.9 % (ref 11.5–14.5)
GAMMA GLOB SERPL ELPH-MCNC: 1.3 G/DL (ref 0.4–1.8)
GLUCOSE SERPL-MCNC: 194 MG/DL (ref 74–106)
GLUCOSE SERPL-MCNC: 87 MG/DL (ref 74–106)
HCT VFR BLD CALC: 39.6 % (ref 42–54)
HGB BLD-MCNC: 12.6 G/DL (ref 13.5–17.5)
LYMPHOCYTES NFR BLD AUTO: 15 % (ref 15–50)
MCH RBC QN AUTO: 30.4 PG (ref 26–34)
MCHC RBC AUTO-ENTMCNC: 31.8 G/DL (ref 31–37)
MCV RBC: 95.4 FL (ref 80–100)
MONOCYTES NFR BLD: 10 % (ref 2–11)
NEUTROPHILS NFR BLD AUTO: 72 % (ref 40–80)
OSMOLALITY SERPL CALC.SUM OF ELEC: 288 MOSM/KG (ref 275–300)
OSMOLALITY SERPL CALC.SUM OF ELEC: 291 MOSM/KG (ref 275–300)
PLATELET # BLD EST: NORMAL 10*3/UL
PLATELET # BLD: 199 10X3/UL (ref 130–400)
PMV BLD AUTO: 11.6 FL (ref 7.4–10.4)
POTASSIUM SERPL-SCNC: 4.4 MMOL/L (ref 3.5–5.1)
POTASSIUM SERPL-SCNC: 4.6 MMOL/L (ref 3.5–5.1)
PROT SERPL-MCNC: 6.5 G/DL (ref 6–8.5)
RBC # BLD AUTO: 4.15 10X6/UL (ref 4.2–6.1)
SODIUM SERPL-SCNC: 137 MMOL/L (ref 136–145)
SODIUM SERPL-SCNC: 138 MMOL/L (ref 136–145)
TROPONIN I SERPL-MCNC: 0.27 NG/ML (ref 0–0.06)
WBC # BLD AUTO: 6.4 10X3/UL (ref 4.8–10.8)

## 2020-02-28 PROCEDURE — 027035Z DILATION OF CORONARY ARTERY, ONE ARTERY WITH TWO DRUG-ELUTING INTRALUMINAL DEVICES, PERCUTANEOUS APPROACH: ICD-10-PCS | Performed by: INTERNAL MEDICINE

## 2020-02-28 PROCEDURE — B2151ZZ FLUOROSCOPY OF LEFT HEART USING LOW OSMOLAR CONTRAST: ICD-10-PCS | Performed by: INTERNAL MEDICINE

## 2020-02-28 PROCEDURE — 4A023N7 MEASUREMENT OF CARDIAC SAMPLING AND PRESSURE, LEFT HEART, PERCUTANEOUS APPROACH: ICD-10-PCS | Performed by: INTERNAL MEDICINE

## 2020-02-28 PROCEDURE — B2111ZZ FLUOROSCOPY OF MULTIPLE CORONARY ARTERIES USING LOW OSMOLAR CONTRAST: ICD-10-PCS | Performed by: INTERNAL MEDICINE

## 2020-02-28 NOTE — NUR
PT RESTING COMFORTABLY ON VENTILATOR, 2 SIDE RAILS UP, CBIR - MINIMAL SEDATION
VSS, TEMP 101.2 - WILL CONTINUE TO MONITOR

## 2020-02-28 NOTE — NUR
REASSESSMENT COMPLETED PER FLOWSHEET, SEE FLOWSHEET FOR INFORMATION. ORAL AND
INLINE SUCTIONED, COPIOUS AMOUNTS OF CLEAR/WHITE SECRETIONS NOTED. NO ACUTE
NEEDS OR DISTRESS NOTED AT THIS TIME. WILL CONT TO MONITOR.

## 2020-02-28 NOTE — NUR
REASSESSMENT COMPLETED PER FLOWSHEET, SEE FLOWSHEET FOR INFORMATION. NO ACUTE
NEEDS OR DISTRESS NOTED AT THIS TIME. VSS. WILL CONT TO MONITOR.

## 2020-02-28 NOTE — NUR
REPORT RECEIVED. CM READING SR WITHOUT ECTOPY ALARMS ON AND AUDIBLE. PT
FOLLOWS COMMANDS AND DENIES PAIN AT THIS TIME ORAL CARE REPOSOTIONED. SEE RT
NOTES FOR VENT CHANGES

## 2020-02-28 NOTE — NUR
PT ARRIVED IN ROOM VIA BED, VSS. NO S/S OF HEMATOMA NOTED IN RIGHT GROIN.
DISTAL PULSES PALABLE. WILL CONT TO MONITOR.

## 2020-02-28 NOTE — NUR
Nutrition follow-up:
Pt intubated, sedated
Remains NPO
Labs reviewed
Wt: 245#
Will wait for oderr to begin TF
Recommend Nepro; see previous note for rate
RDN following

## 2020-02-28 NOTE — NUR
BEDISDE REPORT RECEIVED. SHIFT ASSESSMENT COMPLETED PER FLOWSHEET, SEE
FLOWSHEET FOR INFORMAION. NO ACUTE NEEDS OR DISTRESS NOTED AT THIS TIME. VSS.
WILL CONT TO MONITOR.

## 2020-02-28 NOTE — EC
PATIENT:BALA SCHULTZ              DATE OF SERVICE: 02/26/20
SEX: M                                  MEDICAL RECORD: [id number]
DATE OF BIRTH: [birth year]                        LOCATION[facility]
AGE OF PATIENT: 64                             ADMISSION DATE: 02/26/20
 
REFERRING PHYSICIAN:                               
 
INTERPRETING PHYSICIAN: BREANN GALAN MD             
 
 
 
                             ECHOCARDIOGRAM REPORT
  ECHO CHARGES 4               ECHO COMPLETE                 Date: 02/27/20
 
 
 
CLINICAL DIAGNOSIS: ELEVATED TROPONIN             
 
                         ECHOCARDIOGRAPHIC MEASUREMENTS
      (adult normal given)
   AC root (d.<3.7cm) 3.3  cm   LV Septum d (<1.2 cm> 1.7  cm
      Valve Excursion 1.8  cm     LV Septum (systole) 2.0  cm
Left Atria (s.<4.0cm> 4.0  cm          LVPW d(<1.2cm) 1.6  cm
        RV (d.<2.3cm) 2.9  cm           LVPW (sytole) 1.9  cm
  LV diastole(<5.6CM) 5.2  cm       MV E-F(>70mm/sec)      cm
           LV systole 3.7  cm           LVOT Diameter 2.0  cm
       MV exc.(>10mm)      cm
Est.ejection fraction (50-75%)     %
 
   DOPPLER:
     LVIT      cm/sec A 165  cm/sec E 105   cm/sec
       LA      cm/sec      RVSP 48.3 mmHg
     LVOT 117  cm/sec   AOP1/2T      m/s
  Asc. Ao 206  cm/sec
     RVOT 59.0 cm/sec
       RA      cm/sec
         cm/sec
 AV Gradient Peak 17.0 mmHg  AV Mean 8.9  mmHg  AV Area 1.7  cm
 MV Gradient Peak 14.0 mmHg  MV Mean 3.8  mmHg  MV Area      cm
   COMMENTS:                                              
 
 
 Cardiac Sonographer: 1               KIAH GARCIAOE            
      Cardiologist: 1          Dr. Galan                
             TAPE# PACS           
                                       Pericardial Effusion N                        
 
 
DATE OF SERVICE:  
 
FINDINGS:
1.  Left ventricular chamber size is mildly dilated.  Left ventricular systolic
function is moderately reduced at 30%.
2.  Left atrium is upper limits of normal at 4.0 cm.  Right atrium and right
ventricle chamber sizes are mildly dilated.
3.  Valvular structures:  Aortic valve demonstrates calcific aortic sclerosis,
but no significant aortic stenosis.  The remaining valvular structures have
normal structure and motion.
 
 
 
ECHOCARDIOGRAM REPORT                          [id number]    BALA SCHULTZ      
 
 
4.  Doppler interrogation reveals only trace mitral regurgitation and trace
tricuspid regurgitation, no other valvular insufficiency or stenosis.  Pulmonary
systolic pressure is elevated at 50 mmHg.
5.  No evidence of pericardial effusion or left ventricular thrombus.
 
TRANSINT:[id number] Voice Confirmation ID: [id number] DOCUMENT ID: [id number]
                                           
                                           BREANN GALAN MD             
 
 
 
Electronically Signed by BREANN GALAN on 02/28/20 at 1642
 
 
 
 
 
 
 
 
 
 
 
 
 
 
 
 
 
 
 
 
 
 
 
 
 
 
 
 
 
 
 
 
 
 
CC:                                                             [id number]
DICTATION DATE: 02/28/20 0647     :     02/28/20 0826      ADM IN  
                                                                              
[facility]                                          
[address] [id number]

## 2020-02-28 NOTE — CN
PATIENT NAME:BALA CLARKE                            MEDICAL RECORD: [id number]
: 57                                              LOCATION:[facility]
ADMIT DATE: 20                                       ACCOUNT: [id number]
CONSULTING PHYSICIAN:    BREANN MUHAMMAD MD             
                                               
REFERRING PHYSICIAN:     BRANDON HILTON MD            
 
 
DATE OF CONSULTATION:  2020
 
DIAGNOSES:
1.  Non-Q-wave myocardial infarction.
2.  End-stage renal failure, dialysis.
3.  Cardiomyopathy.
4.  Respiratory failure.
5.  Hypertension.
6.  Hyperlipidemia.
 
HISTORY OF PRESENT ILLNESS:  Mr. Clarke is a gentleman known to us with a past
history of coronary artery disease, previous cardiac stents, who presents with
respiratory failure.  His last ejection fraction we have was 30%.  This is
before this hospitalization and presentation.  His chest x-ray, however, was not
compatible with pulmonary edema and his troponin has elevated.  It is unknown if
he was complaining of chest pain prior to admission as he presented to the
Emergency Room, had to be intubated relatively emergently.
 
PHYSICAL EXAMINATION:
CONSTITUTIONAL/GENERAL APPEARANCE:  Well nourished, well developed, appears
stated age.
EYES:  Lids and conjunctivae noninjected.  No discharge.  No pallor.
ENT:  Lips within normal limit.  No cyanosis.  No pallor.
NECK:  Carotid arteries, bilateral normal upstroke.  No bruits.  No thrills.  No
jugular venous pressure or distention.
CERVICAL LYMPH NODES:  Nontender.  Nonenlarged.
THYROID:  Not enlarged.  No nodules.
CARDIOVASCULAR:  Precordial exam, nondisplaced.  No heaves or pericardial
thrills.  Rate and rhythm, regular.  Heart sounds, normal S1, normal S2.  No S3,
no gallop, no rub.  Systolic murmur, not heard.  Diastolic murmur, not heard.
RESPIRATORY:  Respiratory effort, unlabored.  Normal curvature.  No thoracic
deformity.  No chest wall tenderness.  Percussion, resonant.  Auscultation,
clear.  No wheezes, no rales, no rhonchi.
ABDOMEN:  Soft, nondistended, nontender.  No abdominal pain, no vomiting and
normal appetite.
MUSCULOSKELETAL:  No joint tenderness, normal gait, normal tone.
SKIN:  Warm and dry.
 
OVERALL IMPRESSION:  Non-Q-wave myocardial infarction.  At this time, we will
proceed with coronary angiography.  Further care depends upon the findings of
the angiography.
 
TRANSINT:[id number] Voice Confirmation ID: [id number] DOCUMENT ID: [id number]
 
 
 
CONSULT REPORT                                 [id number]    BALA CLARKE JEFFREY MD             
 
 
 
Electronically Signed by BREANN MUHAMMAD on 20 at 1642
 
 
 
 
 
 
 
 
 
 
 
 
 
 
 
 
 
 
 
 
 
 
 
 
 
 
 
 
 
 
 
 
 
 
 
 
 
 
 
 
 
CC:                                                             [id number]
DICTATION DATE: 20     :     20 1152      ADM IN  
                                                                              
[facility]                                          
[address] [id number]

## 2020-02-29 VITALS — DIASTOLIC BLOOD PRESSURE: 72 MMHG | SYSTOLIC BLOOD PRESSURE: 138 MMHG

## 2020-02-29 VITALS — DIASTOLIC BLOOD PRESSURE: 57 MMHG | SYSTOLIC BLOOD PRESSURE: 98 MMHG

## 2020-02-29 VITALS — DIASTOLIC BLOOD PRESSURE: 22 MMHG | SYSTOLIC BLOOD PRESSURE: 99 MMHG

## 2020-02-29 VITALS — SYSTOLIC BLOOD PRESSURE: 96 MMHG | DIASTOLIC BLOOD PRESSURE: 29 MMHG

## 2020-02-29 VITALS — SYSTOLIC BLOOD PRESSURE: 108 MMHG | DIASTOLIC BLOOD PRESSURE: 28 MMHG

## 2020-02-29 VITALS — SYSTOLIC BLOOD PRESSURE: 117 MMHG | DIASTOLIC BLOOD PRESSURE: 67 MMHG

## 2020-02-29 VITALS — SYSTOLIC BLOOD PRESSURE: 99 MMHG | DIASTOLIC BLOOD PRESSURE: 54 MMHG

## 2020-02-29 VITALS — DIASTOLIC BLOOD PRESSURE: 31 MMHG | SYSTOLIC BLOOD PRESSURE: 99 MMHG

## 2020-02-29 VITALS — DIASTOLIC BLOOD PRESSURE: 66 MMHG | SYSTOLIC BLOOD PRESSURE: 90 MMHG

## 2020-02-29 VITALS — SYSTOLIC BLOOD PRESSURE: 101 MMHG | DIASTOLIC BLOOD PRESSURE: 35 MMHG

## 2020-02-29 VITALS — DIASTOLIC BLOOD PRESSURE: 77 MMHG | SYSTOLIC BLOOD PRESSURE: 103 MMHG

## 2020-02-29 VITALS — SYSTOLIC BLOOD PRESSURE: 94 MMHG | DIASTOLIC BLOOD PRESSURE: 37 MMHG

## 2020-02-29 VITALS — SYSTOLIC BLOOD PRESSURE: 150 MMHG | DIASTOLIC BLOOD PRESSURE: 77 MMHG

## 2020-02-29 VITALS — DIASTOLIC BLOOD PRESSURE: 30 MMHG | SYSTOLIC BLOOD PRESSURE: 104 MMHG

## 2020-02-29 VITALS — DIASTOLIC BLOOD PRESSURE: 97 MMHG | SYSTOLIC BLOOD PRESSURE: 120 MMHG

## 2020-02-29 VITALS — SYSTOLIC BLOOD PRESSURE: 101 MMHG | DIASTOLIC BLOOD PRESSURE: 53 MMHG

## 2020-02-29 VITALS — SYSTOLIC BLOOD PRESSURE: 99 MMHG | DIASTOLIC BLOOD PRESSURE: 35 MMHG

## 2020-02-29 VITALS — DIASTOLIC BLOOD PRESSURE: 50 MMHG | SYSTOLIC BLOOD PRESSURE: 109 MMHG

## 2020-02-29 VITALS — DIASTOLIC BLOOD PRESSURE: 34 MMHG | SYSTOLIC BLOOD PRESSURE: 94 MMHG

## 2020-02-29 VITALS — SYSTOLIC BLOOD PRESSURE: 132 MMHG | DIASTOLIC BLOOD PRESSURE: 54 MMHG

## 2020-02-29 VITALS — DIASTOLIC BLOOD PRESSURE: 30 MMHG | SYSTOLIC BLOOD PRESSURE: 93 MMHG

## 2020-02-29 VITALS — DIASTOLIC BLOOD PRESSURE: 48 MMHG | SYSTOLIC BLOOD PRESSURE: 103 MMHG

## 2020-02-29 VITALS — DIASTOLIC BLOOD PRESSURE: 62 MMHG | SYSTOLIC BLOOD PRESSURE: 96 MMHG

## 2020-02-29 VITALS — DIASTOLIC BLOOD PRESSURE: 40 MMHG | SYSTOLIC BLOOD PRESSURE: 86 MMHG

## 2020-02-29 LAB
ANION GAP SERPL CALC-SCNC: 15.8 MMOL/L (ref 8–16)
BASOPHILS NFR BLD AUTO: 0.2 % (ref 0–2)
BUN SERPL-MCNC: 37 MG/DL (ref 7–18)
CALCIUM SERPL-MCNC: 8.1 MG/DL (ref 8.5–10.1)
CHLORIDE SERPL-SCNC: 100 MMOL/L (ref 98–107)
CO2 SERPL-SCNC: 24.9 MMOL/L (ref 21–32)
CREAT SERPL-MCNC: 8.4 MG/DL (ref 0.6–1.3)
EOSINOPHIL NFR BLD: 4.9 % (ref 0–7)
ERYTHROCYTE [DISTWIDTH] IN BLOOD BY AUTOMATED COUNT: 15.6 % (ref 11.5–14.5)
GLUCOSE SERPL-MCNC: 102 MG/DL (ref 74–106)
HCT VFR BLD CALC: 37.5 % (ref 42–54)
HGB BLD-MCNC: 12.1 G/DL (ref 13.5–17.5)
IMM GRANULOCYTES NFR BLD: 0.2 % (ref 0–5)
LYMPHOCYTES NFR BLD AUTO: 16.6 % (ref 15–50)
MCH RBC QN AUTO: 30.1 PG (ref 26–34)
MCHC RBC AUTO-ENTMCNC: 32.3 G/DL (ref 31–37)
MCV RBC: 93.3 FL (ref 80–100)
MONOCYTES NFR BLD: 19.1 % (ref 2–11)
NEUTROPHILS NFR BLD AUTO: 59 % (ref 40–80)
OSMOLALITY SERPL CALC.SUM OF ELEC: 282 MOSM/KG (ref 275–300)
PLATELET # BLD: 185 10X3/UL (ref 130–400)
PMV BLD AUTO: 10.4 FL (ref 7.4–10.4)
POTASSIUM SERPL-SCNC: 3.7 MMOL/L (ref 3.5–5.1)
RBC # BLD AUTO: 4.02 10X6/UL (ref 4.2–6.1)
SODIUM SERPL-SCNC: 137 MMOL/L (ref 136–145)
VANCOMYCIN SERPL-MCNC: 19.6 UG/ML (ref 10–20)
WBC # BLD AUTO: 6.1 10X3/UL (ref 4.8–10.8)

## 2020-02-29 NOTE — NUR
NO AUCTE DISTRESS NOTED. PT LYING IN BED ON VENT. TURNED Q2H AND ORAL CARE
PRODVIDED Q2H. VSS. WILL CONTINUE PLAN OF CARE.

## 2020-02-29 NOTE — NUR
LYING IN BED ON VENT AT THIS TIME. PT WAKES UP AND FOLLOWS COMMANDS, ABLE TO
REQUEST ITEMS SUCH AS TO BE SUCTIONED. VSS. TURNED Q2H. ORAL CARE PROVIDED
Q2H. WILL CONTINUE PLAN OF CARE.

## 2020-02-29 NOTE — NUR
PT AWAKE ON VENT, REQUESTED TO BE SUCTIONED. SUCTIONING PROVIDED ALONG WITH
ORAL CARE AS IS PROVIDED Q2H. REPOSITIONING ALSO PROVIDED AND IS PROVIDED Q2H.
WILL CONTINUE PLAN OF CARE.

## 2020-02-29 NOTE — NUR
NO ACUTE DISTRESS NOTED AT THIS TIME. PT HAS BEEN ON CPAP TRIAL SINCE AROUND
0900, TOLERATING WELL. FAMILY AT BEDSIDE. WILL CONTINUE PLAN OF CARE.

## 2020-02-29 NOTE — NUR
REPORT RECEIVED. INITIAL ASSESSMENT COMPLETE SEE FLOWSHEET. PT FOLLOWS
COMMANDS NODS HEAD TO QUESTIONS LIGHT FENTANYL SEDATION POSSIBLE EXTUBATE
TOMORROW. CM READING SR WITHOUT ECTOPY ALARMS ON AND AUDIBLE. ORAL CARE
REPOSITIONED CPOC

## 2020-03-01 VITALS — DIASTOLIC BLOOD PRESSURE: 40 MMHG | SYSTOLIC BLOOD PRESSURE: 124 MMHG

## 2020-03-01 VITALS — DIASTOLIC BLOOD PRESSURE: 69 MMHG | SYSTOLIC BLOOD PRESSURE: 89 MMHG

## 2020-03-01 VITALS — SYSTOLIC BLOOD PRESSURE: 120 MMHG | DIASTOLIC BLOOD PRESSURE: 50 MMHG

## 2020-03-01 VITALS — SYSTOLIC BLOOD PRESSURE: 118 MMHG | DIASTOLIC BLOOD PRESSURE: 94 MMHG

## 2020-03-01 VITALS — DIASTOLIC BLOOD PRESSURE: 93 MMHG | SYSTOLIC BLOOD PRESSURE: 119 MMHG

## 2020-03-01 VITALS — DIASTOLIC BLOOD PRESSURE: 54 MMHG | SYSTOLIC BLOOD PRESSURE: 126 MMHG

## 2020-03-01 VITALS — SYSTOLIC BLOOD PRESSURE: 77 MMHG | DIASTOLIC BLOOD PRESSURE: 30 MMHG

## 2020-03-01 VITALS — DIASTOLIC BLOOD PRESSURE: 65 MMHG | SYSTOLIC BLOOD PRESSURE: 143 MMHG

## 2020-03-01 VITALS — DIASTOLIC BLOOD PRESSURE: 77 MMHG | SYSTOLIC BLOOD PRESSURE: 104 MMHG

## 2020-03-01 VITALS — SYSTOLIC BLOOD PRESSURE: 146 MMHG | DIASTOLIC BLOOD PRESSURE: 82 MMHG

## 2020-03-01 VITALS — DIASTOLIC BLOOD PRESSURE: 84 MMHG | SYSTOLIC BLOOD PRESSURE: 142 MMHG

## 2020-03-01 VITALS — DIASTOLIC BLOOD PRESSURE: 84 MMHG | SYSTOLIC BLOOD PRESSURE: 106 MMHG

## 2020-03-01 VITALS — SYSTOLIC BLOOD PRESSURE: 118 MMHG | DIASTOLIC BLOOD PRESSURE: 34 MMHG

## 2020-03-01 VITALS — DIASTOLIC BLOOD PRESSURE: 79 MMHG | SYSTOLIC BLOOD PRESSURE: 144 MMHG

## 2020-03-01 VITALS — SYSTOLIC BLOOD PRESSURE: 104 MMHG | DIASTOLIC BLOOD PRESSURE: 72 MMHG

## 2020-03-01 VITALS — DIASTOLIC BLOOD PRESSURE: 73 MMHG | SYSTOLIC BLOOD PRESSURE: 103 MMHG

## 2020-03-01 VITALS — SYSTOLIC BLOOD PRESSURE: 148 MMHG | DIASTOLIC BLOOD PRESSURE: 74 MMHG

## 2020-03-01 VITALS — SYSTOLIC BLOOD PRESSURE: 11 MMHG | DIASTOLIC BLOOD PRESSURE: 52 MMHG

## 2020-03-01 VITALS — SYSTOLIC BLOOD PRESSURE: 114 MMHG | DIASTOLIC BLOOD PRESSURE: 62 MMHG

## 2020-03-01 VITALS — SYSTOLIC BLOOD PRESSURE: 128 MMHG | DIASTOLIC BLOOD PRESSURE: 70 MMHG

## 2020-03-01 VITALS — SYSTOLIC BLOOD PRESSURE: 118 MMHG | DIASTOLIC BLOOD PRESSURE: 41 MMHG

## 2020-03-01 VITALS — DIASTOLIC BLOOD PRESSURE: 73 MMHG | SYSTOLIC BLOOD PRESSURE: 108 MMHG

## 2020-03-01 VITALS — DIASTOLIC BLOOD PRESSURE: 43 MMHG | SYSTOLIC BLOOD PRESSURE: 94 MMHG

## 2020-03-01 VITALS — DIASTOLIC BLOOD PRESSURE: 72 MMHG | SYSTOLIC BLOOD PRESSURE: 152 MMHG

## 2020-03-01 LAB
ANION GAP SERPL CALC-SCNC: 18.5 MMOL/L (ref 8–16)
BASOPHILS NFR BLD AUTO: 0.1 % (ref 0–2)
BUN SERPL-MCNC: 50 MG/DL (ref 7–18)
CALCIUM SERPL-MCNC: 11.7 MG/DL (ref 8.5–10.1)
CHLORIDE SERPL-SCNC: 100 MMOL/L (ref 98–107)
CO2 SERPL-SCNC: 20.6 MMOL/L (ref 21–32)
CREAT SERPL-MCNC: 10.2 MG/DL (ref 0.6–1.3)
EOSINOPHIL NFR BLD: 4.3 % (ref 0–7)
ERYTHROCYTE [DISTWIDTH] IN BLOOD BY AUTOMATED COUNT: 15.5 % (ref 11.5–14.5)
GLUCOSE SERPL-MCNC: 85 MG/DL (ref 74–106)
HCT VFR BLD CALC: 36.3 % (ref 42–54)
HGB BLD-MCNC: 11.8 G/DL (ref 13.5–17.5)
IMM GRANULOCYTES NFR BLD: 0.1 % (ref 0–5)
LYMPHOCYTES NFR BLD AUTO: 11.5 % (ref 15–50)
MCH RBC QN AUTO: 30 PG (ref 26–34)
MCHC RBC AUTO-ENTMCNC: 32.5 G/DL (ref 31–37)
MCV RBC: 92.4 FL (ref 80–100)
MONOCYTES NFR BLD: 14.4 % (ref 2–11)
NEUTROPHILS NFR BLD AUTO: 69.6 % (ref 40–80)
OSMOLALITY SERPL CALC.SUM OF ELEC: 281 MOSM/KG (ref 275–300)
PLATELET # BLD: 184 10X3/UL (ref 130–400)
PMV BLD AUTO: 11.8 FL (ref 7.4–10.4)
POTASSIUM SERPL-SCNC: 4.1 MMOL/L (ref 3.5–5.1)
RBC # BLD AUTO: 3.93 10X6/UL (ref 4.2–6.1)
SODIUM SERPL-SCNC: 135 MMOL/L (ref 136–145)
VANCOMYCIN SERPL-MCNC: 18.3 UG/ML (ref 10–20)
WBC # BLD AUTO: 6.7 10X3/UL (ref 4.8–10.8)

## 2020-03-01 NOTE — NUR
FAMILY AT BEDSIDE AT THIS TIME. VSS. NO ACUTE DISTRESS NOTED. PT TURNED Q2H.
ORAL CARE PROVIDED Q2H AND PRN. WILL CONTINUE PLAN OF CARE.

## 2020-03-01 NOTE — NUR
DR DOW PAGED AGAIN REGARDING ABG RESULTS SINCE NO CALLBACK RECIEVED EARLIER.
PER DR DOW, PLACE PT BACK ON AC VENT SETTINGS TO LET PT REST. RESPIRATORY
THERAPIST NOTIFIED. VSS. WILL CONTINUE PLAN OF CARE.

## 2020-03-01 NOTE — NUR
PT OXYGEN SATURATION DESAT TO 86-89% WITH GOOD WAVEFORM SUCTIONING PROVIDED,
OXYGEN SATURATION INCREASED TO 96% WITH GOOD WAVEFORM, PT WAKES UP AND ANSWERS
YES/NO QUESTIONS. PT ALSO FOLLOWS COMMANDS. WILL CONTINUE PLAN OF CARE.

## 2020-03-01 NOTE — NUR
CALM ON CPAP TRIAL AT THIS TIME, TOLERATING WELL AND PT IS CALM. VSS. NO ACUTE
DISTRESS NOTED. WILL CONTINUE PLAN OF CARE.

## 2020-03-01 NOTE — NUR
AWAKE ON VENT AT THIS TIME. VSS. NO ACUTE DISTRESS NOTED. PT FOLLOWS COMMANDS
AND ABLE TO NOTIFY STAFF OF NEEDS. PT TURNED Q2H. ORAL CARE PROVIDED Q2H. WILL
CONTINUE PLAN OF CARE.

## 2020-03-01 NOTE — NUR
CHG BATH PROVIDED AT THIS TIME. TOTAL LINEN CHANGE PROVIDED. RAYSHAWN CARE
PROVIDED. SUCTIONING PROVIDED. VSS. WILL CONTINUE PLAN OF CARE.

## 2020-03-02 VITALS — SYSTOLIC BLOOD PRESSURE: 128 MMHG | DIASTOLIC BLOOD PRESSURE: 53 MMHG

## 2020-03-02 VITALS — SYSTOLIC BLOOD PRESSURE: 141 MMHG | DIASTOLIC BLOOD PRESSURE: 79 MMHG

## 2020-03-02 VITALS — DIASTOLIC BLOOD PRESSURE: 50 MMHG | SYSTOLIC BLOOD PRESSURE: 110 MMHG

## 2020-03-02 VITALS — SYSTOLIC BLOOD PRESSURE: 109 MMHG | DIASTOLIC BLOOD PRESSURE: 57 MMHG

## 2020-03-02 VITALS — DIASTOLIC BLOOD PRESSURE: 54 MMHG | SYSTOLIC BLOOD PRESSURE: 152 MMHG

## 2020-03-02 VITALS — SYSTOLIC BLOOD PRESSURE: 110 MMHG | DIASTOLIC BLOOD PRESSURE: 39 MMHG

## 2020-03-02 VITALS — SYSTOLIC BLOOD PRESSURE: 136 MMHG | DIASTOLIC BLOOD PRESSURE: 85 MMHG

## 2020-03-02 VITALS — SYSTOLIC BLOOD PRESSURE: 115 MMHG | DIASTOLIC BLOOD PRESSURE: 39 MMHG

## 2020-03-02 VITALS — DIASTOLIC BLOOD PRESSURE: 44 MMHG | SYSTOLIC BLOOD PRESSURE: 155 MMHG

## 2020-03-02 VITALS — SYSTOLIC BLOOD PRESSURE: 127 MMHG | DIASTOLIC BLOOD PRESSURE: 89 MMHG

## 2020-03-02 VITALS — DIASTOLIC BLOOD PRESSURE: 64 MMHG | SYSTOLIC BLOOD PRESSURE: 123 MMHG

## 2020-03-02 VITALS — SYSTOLIC BLOOD PRESSURE: 114 MMHG | DIASTOLIC BLOOD PRESSURE: 29 MMHG

## 2020-03-02 VITALS — DIASTOLIC BLOOD PRESSURE: 48 MMHG | SYSTOLIC BLOOD PRESSURE: 115 MMHG

## 2020-03-02 VITALS — DIASTOLIC BLOOD PRESSURE: 57 MMHG | SYSTOLIC BLOOD PRESSURE: 148 MMHG

## 2020-03-02 VITALS — DIASTOLIC BLOOD PRESSURE: 51 MMHG | SYSTOLIC BLOOD PRESSURE: 113 MMHG

## 2020-03-02 VITALS — SYSTOLIC BLOOD PRESSURE: 100 MMHG | DIASTOLIC BLOOD PRESSURE: 66 MMHG

## 2020-03-02 VITALS — SYSTOLIC BLOOD PRESSURE: 107 MMHG | DIASTOLIC BLOOD PRESSURE: 71 MMHG

## 2020-03-02 VITALS — SYSTOLIC BLOOD PRESSURE: 113 MMHG | DIASTOLIC BLOOD PRESSURE: 43 MMHG

## 2020-03-02 VITALS — SYSTOLIC BLOOD PRESSURE: 125 MMHG | DIASTOLIC BLOOD PRESSURE: 54 MMHG

## 2020-03-02 LAB
ANION GAP SERPL CALC-SCNC: 20.6 MMOL/L (ref 8–16)
BASOPHILS NFR BLD AUTO: 0.1 % (ref 0–2)
BUN SERPL-MCNC: 61 MG/DL (ref 7–18)
CALCIUM SERPL-MCNC: 8.4 MG/DL (ref 8.5–10.1)
CHLORIDE SERPL-SCNC: 99 MMOL/L (ref 98–107)
CO2 SERPL-SCNC: 21.5 MMOL/L (ref 21–32)
CREAT SERPL-MCNC: 12.2 MG/DL (ref 0.6–1.3)
EOSINOPHIL NFR BLD: 3.9 % (ref 0–7)
ERYTHROCYTE [DISTWIDTH] IN BLOOD BY AUTOMATED COUNT: 15.5 % (ref 11.5–14.5)
GLUCOSE SERPL-MCNC: 82 MG/DL (ref 74–106)
HCT VFR BLD CALC: 33.5 % (ref 42–54)
HGB BLD-MCNC: 11.2 G/DL (ref 13.5–17.5)
IMM GRANULOCYTES NFR BLD: 0.1 % (ref 0–5)
LYMPHOCYTES NFR BLD AUTO: 12.3 % (ref 15–50)
MCH RBC QN AUTO: 30.2 PG (ref 26–34)
MCHC RBC AUTO-ENTMCNC: 33.4 G/DL (ref 31–37)
MCV RBC: 90.3 FL (ref 80–100)
MONOCYTES NFR BLD: 14 % (ref 2–11)
NEUTROPHILS NFR BLD AUTO: 69.6 % (ref 40–80)
OSMOLALITY SERPL CALC.SUM OF ELEC: 289 MOSM/KG (ref 275–300)
PLATELET # BLD: 195 10X3/UL (ref 130–400)
PMV BLD AUTO: 11.1 FL (ref 7.4–10.4)
POTASSIUM SERPL-SCNC: 4.1 MMOL/L (ref 3.5–5.1)
RBC # BLD AUTO: 3.71 10X6/UL (ref 4.2–6.1)
SODIUM SERPL-SCNC: 137 MMOL/L (ref 136–145)
VANCOMYCIN SERPL-MCNC: 16.2 UG/ML (ref 10–20)
WBC # BLD AUTO: 6.9 10X3/UL (ref 4.8–10.8)

## 2020-03-02 NOTE — NUR
REPORT RECEIVED. ASSESSMENT COMPLETE PER FLOW SHEET. VSS. PT RESTING
COMFORTABLY ORAL ENDOTRACH CARE ADM. REPOSITIONED FOR COMFORT WILL CONTINUE TO
MONITOR

## 2020-03-02 NOTE — NUR
Report received from off going nurse. Pt is laying in bed intubated and
sedated. Repositioned for comfort. Oral care performed. No s/s of distress
noted. Will continue to monitor.

## 2020-03-02 NOTE — NUR
Nutrition follow-up:
Pt remains intuabed, sedated
CPAP trials
Nepro on hold at this time for possible heart cath today; weaning
Labs reviewed
Wt: 249#
RDN following.

## 2020-03-02 NOTE — NUR
Reassessment completed, see flowsheet for details. Pt is laying in bed
intubated. Repositioned for comfort. Oral care performed. No further needs
identified. No s/s of distress noted. Will continue to monitor.

## 2020-03-02 NOTE — NUR
Pt is laying in bed intubated and sedated. Repositioned for comfort. Oral care
performed. Family at bedside. No further needs noted. No s/s of distress
noted. Will continue to monitor.

## 2020-03-03 VITALS — DIASTOLIC BLOOD PRESSURE: 57 MMHG | SYSTOLIC BLOOD PRESSURE: 128 MMHG

## 2020-03-03 VITALS — SYSTOLIC BLOOD PRESSURE: 98 MMHG | DIASTOLIC BLOOD PRESSURE: 49 MMHG

## 2020-03-03 VITALS — SYSTOLIC BLOOD PRESSURE: 109 MMHG | DIASTOLIC BLOOD PRESSURE: 64 MMHG

## 2020-03-03 VITALS — SYSTOLIC BLOOD PRESSURE: 140 MMHG | DIASTOLIC BLOOD PRESSURE: 47 MMHG

## 2020-03-03 VITALS — DIASTOLIC BLOOD PRESSURE: 75 MMHG | SYSTOLIC BLOOD PRESSURE: 114 MMHG

## 2020-03-03 VITALS — SYSTOLIC BLOOD PRESSURE: 122 MMHG | DIASTOLIC BLOOD PRESSURE: 51 MMHG

## 2020-03-03 VITALS — DIASTOLIC BLOOD PRESSURE: 56 MMHG | SYSTOLIC BLOOD PRESSURE: 107 MMHG

## 2020-03-03 VITALS — SYSTOLIC BLOOD PRESSURE: 121 MMHG | DIASTOLIC BLOOD PRESSURE: 49 MMHG

## 2020-03-03 VITALS — SYSTOLIC BLOOD PRESSURE: 118 MMHG | DIASTOLIC BLOOD PRESSURE: 78 MMHG

## 2020-03-03 VITALS — DIASTOLIC BLOOD PRESSURE: 43 MMHG | SYSTOLIC BLOOD PRESSURE: 123 MMHG

## 2020-03-03 VITALS — DIASTOLIC BLOOD PRESSURE: 38 MMHG | SYSTOLIC BLOOD PRESSURE: 93 MMHG

## 2020-03-03 VITALS — DIASTOLIC BLOOD PRESSURE: 41 MMHG | SYSTOLIC BLOOD PRESSURE: 93 MMHG

## 2020-03-03 VITALS — DIASTOLIC BLOOD PRESSURE: 53 MMHG | SYSTOLIC BLOOD PRESSURE: 117 MMHG

## 2020-03-03 VITALS — SYSTOLIC BLOOD PRESSURE: 123 MMHG | DIASTOLIC BLOOD PRESSURE: 66 MMHG

## 2020-03-03 VITALS — DIASTOLIC BLOOD PRESSURE: 45 MMHG | SYSTOLIC BLOOD PRESSURE: 132 MMHG

## 2020-03-03 VITALS — SYSTOLIC BLOOD PRESSURE: 118 MMHG | DIASTOLIC BLOOD PRESSURE: 81 MMHG

## 2020-03-03 VITALS — SYSTOLIC BLOOD PRESSURE: 108 MMHG | DIASTOLIC BLOOD PRESSURE: 53 MMHG

## 2020-03-03 VITALS — SYSTOLIC BLOOD PRESSURE: 120 MMHG | DIASTOLIC BLOOD PRESSURE: 61 MMHG

## 2020-03-03 VITALS — SYSTOLIC BLOOD PRESSURE: 128 MMHG | DIASTOLIC BLOOD PRESSURE: 68 MMHG

## 2020-03-03 LAB
ANION GAP SERPL CALC-SCNC: 25.2 MMOL/L (ref 8–16)
BASOPHILS NFR BLD AUTO: 0.3 % (ref 0–2)
BUN SERPL-MCNC: 72 MG/DL (ref 7–18)
CALCIUM SERPL-MCNC: 8.2 MG/DL (ref 8.5–10.1)
CHLORIDE SERPL-SCNC: 98 MMOL/L (ref 98–107)
CO2 SERPL-SCNC: 17.8 MMOL/L (ref 21–32)
CREAT SERPL-MCNC: 13.4 MG/DL (ref 0.6–1.3)
EOSINOPHIL NFR BLD: 8.4 % (ref 0–7)
ERYTHROCYTE [DISTWIDTH] IN BLOOD BY AUTOMATED COUNT: 15.4 % (ref 11.5–14.5)
GLUCOSE SERPL-MCNC: 60 MG/DL (ref 74–106)
HCT VFR BLD CALC: 35.2 % (ref 42–54)
HGB BLD-MCNC: 11.8 G/DL (ref 13.5–17.5)
IMM GRANULOCYTES NFR BLD: 0.3 % (ref 0–5)
LYMPHOCYTES NFR BLD AUTO: 10.8 % (ref 15–50)
MAGNESIUM SERPL-MCNC: 2.2 MG/DL (ref 1.8–2.4)
MCH RBC QN AUTO: 29.8 PG (ref 26–34)
MCHC RBC AUTO-ENTMCNC: 33.5 G/DL (ref 31–37)
MCV RBC: 88.9 FL (ref 80–100)
MONOCYTES NFR BLD: 13.3 % (ref 2–11)
NEUTROPHILS NFR BLD AUTO: 66.9 % (ref 40–80)
OSMOLALITY SERPL CALC.SUM OF ELEC: 290 MOSM/KG (ref 275–300)
PHOSPHATE SERPL-MCNC: 7.3 MG/DL (ref 2.5–4.9)
PLATELET # BLD: 177 10X3/UL (ref 130–400)
PMV BLD AUTO: 10.7 FL (ref 7.4–10.4)
POTASSIUM SERPL-SCNC: 5 MMOL/L (ref 3.5–5.1)
RBC # BLD AUTO: 3.96 10X6/UL (ref 4.2–6.1)
SODIUM SERPL-SCNC: 136 MMOL/L (ref 136–145)
VANCOMYCIN SERPL-MCNC: 14.5 UG/ML (ref 10–20)
WBC # BLD AUTO: 7.8 10X3/UL (ref 4.8–10.8)

## 2020-03-03 NOTE — NUR
On pt's ABG this morning his glucose was 48, treated glucose per hypoglycemia
policy. Fingerstick afterward was 89. Repositioned pt for comfort. Oral care
performed. No further needs noted. Will continue to monitor.

## 2020-03-03 NOTE — NUR
Pt is laying in bed intubated and sedated. Repositioned for comfort. Oral care
performed. No further needs indentified. No s/s of distress noted. Will
continue to monitor.

## 2020-03-03 NOTE — NUR
REPORT RECEIVED. ASSESSMENT COMPLETE PER FLOW SHEET. VSS. PT RESTING
COMFORTABLY WILL CONTINUE TO MONITOR

## 2020-03-04 VITALS — DIASTOLIC BLOOD PRESSURE: 54 MMHG | SYSTOLIC BLOOD PRESSURE: 139 MMHG

## 2020-03-04 VITALS — DIASTOLIC BLOOD PRESSURE: 66 MMHG | SYSTOLIC BLOOD PRESSURE: 132 MMHG

## 2020-03-04 VITALS — DIASTOLIC BLOOD PRESSURE: 56 MMHG | SYSTOLIC BLOOD PRESSURE: 143 MMHG

## 2020-03-04 VITALS — SYSTOLIC BLOOD PRESSURE: 139 MMHG | DIASTOLIC BLOOD PRESSURE: 46 MMHG

## 2020-03-04 VITALS — SYSTOLIC BLOOD PRESSURE: 148 MMHG | DIASTOLIC BLOOD PRESSURE: 60 MMHG

## 2020-03-04 VITALS — SYSTOLIC BLOOD PRESSURE: 132 MMHG | DIASTOLIC BLOOD PRESSURE: 65 MMHG

## 2020-03-04 VITALS — DIASTOLIC BLOOD PRESSURE: 52 MMHG | SYSTOLIC BLOOD PRESSURE: 143 MMHG

## 2020-03-04 VITALS — SYSTOLIC BLOOD PRESSURE: 145 MMHG | DIASTOLIC BLOOD PRESSURE: 54 MMHG

## 2020-03-04 VITALS — SYSTOLIC BLOOD PRESSURE: 117 MMHG | DIASTOLIC BLOOD PRESSURE: 48 MMHG

## 2020-03-04 VITALS — SYSTOLIC BLOOD PRESSURE: 137 MMHG | DIASTOLIC BLOOD PRESSURE: 52 MMHG

## 2020-03-04 VITALS — SYSTOLIC BLOOD PRESSURE: 129 MMHG | DIASTOLIC BLOOD PRESSURE: 60 MMHG

## 2020-03-04 VITALS — DIASTOLIC BLOOD PRESSURE: 58 MMHG | SYSTOLIC BLOOD PRESSURE: 131 MMHG

## 2020-03-04 VITALS — SYSTOLIC BLOOD PRESSURE: 109 MMHG | DIASTOLIC BLOOD PRESSURE: 55 MMHG

## 2020-03-04 VITALS — DIASTOLIC BLOOD PRESSURE: 59 MMHG | SYSTOLIC BLOOD PRESSURE: 148 MMHG

## 2020-03-04 VITALS — SYSTOLIC BLOOD PRESSURE: 142 MMHG | DIASTOLIC BLOOD PRESSURE: 58 MMHG

## 2020-03-04 VITALS — SYSTOLIC BLOOD PRESSURE: 141 MMHG | DIASTOLIC BLOOD PRESSURE: 45 MMHG

## 2020-03-04 VITALS — SYSTOLIC BLOOD PRESSURE: 112 MMHG | DIASTOLIC BLOOD PRESSURE: 75 MMHG

## 2020-03-04 VITALS — DIASTOLIC BLOOD PRESSURE: 58 MMHG | SYSTOLIC BLOOD PRESSURE: 135 MMHG

## 2020-03-04 VITALS — SYSTOLIC BLOOD PRESSURE: 115 MMHG | DIASTOLIC BLOOD PRESSURE: 61 MMHG

## 2020-03-04 VITALS — DIASTOLIC BLOOD PRESSURE: 54 MMHG | SYSTOLIC BLOOD PRESSURE: 130 MMHG

## 2020-03-04 VITALS — DIASTOLIC BLOOD PRESSURE: 43 MMHG | SYSTOLIC BLOOD PRESSURE: 122 MMHG

## 2020-03-04 VITALS — SYSTOLIC BLOOD PRESSURE: 147 MMHG | DIASTOLIC BLOOD PRESSURE: 55 MMHG

## 2020-03-04 VITALS — SYSTOLIC BLOOD PRESSURE: 149 MMHG | DIASTOLIC BLOOD PRESSURE: 59 MMHG

## 2020-03-04 LAB
ANION GAP SERPL CALC-SCNC: 17.7 MMOL/L (ref 8–16)
BASOPHILS NFR BLD AUTO: 0.4 % (ref 0–2)
BUN SERPL-MCNC: 43 MG/DL (ref 7–18)
CALCIUM SERPL-MCNC: 8.1 MG/DL (ref 8.5–10.1)
CHLORIDE SERPL-SCNC: 96 MMOL/L (ref 98–107)
CO2 SERPL-SCNC: 26.2 MMOL/L (ref 21–32)
CREAT SERPL-MCNC: 10 MG/DL (ref 0.6–1.3)
EOSINOPHIL NFR BLD: 11.2 % (ref 0–7)
ERYTHROCYTE [DISTWIDTH] IN BLOOD BY AUTOMATED COUNT: 15.6 % (ref 11.5–14.5)
GLUCOSE SERPL-MCNC: 79 MG/DL (ref 74–106)
HCT VFR BLD CALC: 35 % (ref 42–54)
HCV AB S/CO SERPL IA: 0.2 S/CO RAT (ref 0–0.9)
HGB BLD-MCNC: 11.5 G/DL (ref 13.5–17.5)
IMM GRANULOCYTES NFR BLD: 0.1 % (ref 0–5)
LYMPHOCYTES NFR BLD AUTO: 10.2 % (ref 15–50)
MCH RBC QN AUTO: 29.6 PG (ref 26–34)
MCHC RBC AUTO-ENTMCNC: 32.9 G/DL (ref 31–37)
MCV RBC: 90 FL (ref 80–100)
MONOCYTES NFR BLD: 15.9 % (ref 2–11)
NEUTROPHILS NFR BLD AUTO: 62.2 % (ref 40–80)
OSMOLALITY SERPL CALC.SUM OF ELEC: 281 MOSM/KG (ref 275–300)
PHOSPHATE SERPL-MCNC: 5.1 MG/DL (ref 2.5–4.9)
PLATELET # BLD: 218 10X3/UL (ref 130–400)
PMV BLD AUTO: 10 FL (ref 7.4–10.4)
POTASSIUM SERPL-SCNC: 3.9 MMOL/L (ref 3.5–5.1)
RBC # BLD AUTO: 3.89 10X6/UL (ref 4.2–6.1)
SODIUM SERPL-SCNC: 136 MMOL/L (ref 136–145)
VANCOMYCIN SERPL-MCNC: 22.8 UG/ML (ref 10–20)
WBC # BLD AUTO: 6.7 10X3/UL (ref 4.8–10.8)

## 2020-03-04 PROCEDURE — 027236Z DILATION OF CORONARY ARTERY, THREE ARTERIES WITH THREE DRUG-ELUTING INTRALUMINAL DEVICES, PERCUTANEOUS APPROACH: ICD-10-PCS | Performed by: INTERNAL MEDICINE

## 2020-03-04 NOTE — NUR
Occupational Therapy  Facility/Department: [facility]  Daily Treatment Note  NAME: Columba Vizcarra  :   MRN: [id number]    Date of Service: 2017    Patient Diagnosis(es): There were no encounter diagnoses. has a past medical history of Abnormality of gait and mobility; Acute sinusitis; Anxiety; Aspiration into respiratory tract; Aspiration pneumonia (HCC); BPH (benign prostatic hyperplasia); COPD (chronic obstructive pulmonary disease) ([facility] [address] Debility; Elevated CK; Foraminal stenosis of lumbosacral region; GERD (gastroesophageal reflux disease); History of asthma; HTN (hypertension); Hyperlipidemia; Hypothyroidism; Insomnia; Lower extremity weakness; On home oxygen therapy; Osteoarthritis of spine with radiculopathy, lumbar region; Papillary thyroid carcinoma ([facility] [address] Positive D dimer; Sleep apnea; Type 2 diabetes mellitus ([facility] [address] and Vitamin D deficiency. has a past surgical history that includes knee surgery (Left, ); Thyroidectomy (); bronchoscopy (N/A, 2017); and thoracotomy (Right, 2017).     Restrictions  Restrictions/Precautions  Restrictions/Precautions: Fall Risk, General Precautions  Required Braces or Orthoses?: No  Position Activity Restriction  Other position/activity restrictions: no straws     Subjective   General  Chart Reviewed: Yes  Patient assessed for rehabilitation services?: Yes  Family / Caregiver Present:  (Pt's friends present during therapy session)  Referring Practitioner: Dr. Darshana Carolina  Diagnosis: Right thoracotomy wedge resection for foreign body  Subjective  Subjective: Patient seen following physical therapy    Pain  Pre Treatment Pain Screening  Pain at present: 0  Scale Used: Numeric Score  Intervention List: Patient able to continue with treatment  Pain Assessment  Patient Currently in Pain: No  Pain Assessment: 0-10  Pain Level: 0  Vital Signs  Patient Currently in Pain: No     Objective    Upper Extremity Function  UE Strengthing: Patient patient gone to cath lab engages in multiple therapeutic activities to increase bilateral UE strengthening. Patient completes 3-sided dowel tower for repeitive reaching at and above shoulder height with alternate hands. Patient places and removes large and small dowels, along with corresponding rings with good tolerance to task and no rest breaks required. Patient also engages in hand strengthening activity with resisitive clothespins. Patient independent to place and remove all resistances 1-8# from vertical and horizontal poles with good tolerance. Assessment      Activity Tolerance  Activity Tolerance: Patient Tolerated treatment well  Safety Devices  Safety Devices in place: Yes  Type of devices: All fall risk precautions in place       Discharge Recommendations:  Continue to assess pending progress (OT anticipates pt will be able to discharge home with [address] [address] therapy support)     Plan   Plan  Times per week: 5-7x/week,  minutes per day  Plan weeks: 10-14 days  Current Treatment Recommendations: Strengthening, Endurance Training, Patient/Caregiver Education & Training, Self-Care / ADL, Home Management Training, Safety Education & Training  Plan Comment: Continue per OT POC    Goals  Patient Goals   Patient goals : \"Be able to do things without being winded. \" \"I want to get back into the community. \"       Therapy Time   Individual Concurrent Group Co-treatment   Time In 1500         Time Out 1530         Minutes 30               Electronically signed by STEVE Packer/L on 11/25/2017 at 4:20 PM  STEVE Packer/[place]

## 2020-03-04 NOTE — NUR
1900 - REPORT RECEIVED. PT SEDATED ON VENT, FOLLOWS COMMANDS. SOFT WRIST
RESTRAINTS IN PLACE, NO ACUTE DISTRESS NOTED. PIV IN LEFT WRIST X2, SEE IV
FLOWSHEET. ASSESSMENT COMPLETE, SEE FLOWSHEET.
 
2100 - PT SEDATED ON VENT, NO ACUTE DISTRESS NOTED.
 
2300 - PT SEDATED ON VENT, REPOSITIONED FOR COMFORT.
 
0100 - HYPOGLYCEMIC PROTOCOL INITIATED, BS RESOLVED.
 
0300 - SEDATED ON VENT, REPOSITIONED FOR COMFORT.
 
0500 - PT SEDATED ON VENT, FOLLOWS COMMANDS. NO CHANGES SINCE PREVIOUS STATUS.

## 2020-03-04 NOTE — NUR
NO S/S OF HEMATOMA. NO BLEEDING AT SITE. CDI DRESSING. PALP PULSES BILAT. WARM
EXTREMITIES LOWER. SOFT AND BOGGY ON R FEMORAL. WILL CONTINUE TO MONITOR

## 2020-03-04 NOTE — NUR
Nutrition follow-up:
Pt remains intubated, sedated
Pt to cath lab at this time
Nepro TF off
Labs reviewed
Wt: 247#
Glucose has been running low per nursing
Recommend restarting TF post-cath
RDN following.

## 2020-03-05 VITALS — SYSTOLIC BLOOD PRESSURE: 110 MMHG | DIASTOLIC BLOOD PRESSURE: 55 MMHG

## 2020-03-05 VITALS — SYSTOLIC BLOOD PRESSURE: 128 MMHG | DIASTOLIC BLOOD PRESSURE: 57 MMHG

## 2020-03-05 VITALS — SYSTOLIC BLOOD PRESSURE: 138 MMHG | DIASTOLIC BLOOD PRESSURE: 66 MMHG

## 2020-03-05 VITALS — DIASTOLIC BLOOD PRESSURE: 55 MMHG | SYSTOLIC BLOOD PRESSURE: 129 MMHG

## 2020-03-05 VITALS — SYSTOLIC BLOOD PRESSURE: 104 MMHG | DIASTOLIC BLOOD PRESSURE: 42 MMHG

## 2020-03-05 VITALS — SYSTOLIC BLOOD PRESSURE: 127 MMHG | DIASTOLIC BLOOD PRESSURE: 67 MMHG

## 2020-03-05 VITALS — SYSTOLIC BLOOD PRESSURE: 131 MMHG | DIASTOLIC BLOOD PRESSURE: 73 MMHG

## 2020-03-05 VITALS — DIASTOLIC BLOOD PRESSURE: 87 MMHG | SYSTOLIC BLOOD PRESSURE: 180 MMHG

## 2020-03-05 VITALS — SYSTOLIC BLOOD PRESSURE: 120 MMHG | DIASTOLIC BLOOD PRESSURE: 67 MMHG

## 2020-03-05 VITALS — SYSTOLIC BLOOD PRESSURE: 124 MMHG | DIASTOLIC BLOOD PRESSURE: 45 MMHG

## 2020-03-05 VITALS — SYSTOLIC BLOOD PRESSURE: 112 MMHG | DIASTOLIC BLOOD PRESSURE: 35 MMHG

## 2020-03-05 VITALS — DIASTOLIC BLOOD PRESSURE: 52 MMHG | SYSTOLIC BLOOD PRESSURE: 139 MMHG

## 2020-03-05 VITALS — DIASTOLIC BLOOD PRESSURE: 46 MMHG | SYSTOLIC BLOOD PRESSURE: 119 MMHG

## 2020-03-05 VITALS — DIASTOLIC BLOOD PRESSURE: 57 MMHG | SYSTOLIC BLOOD PRESSURE: 142 MMHG

## 2020-03-05 VITALS — DIASTOLIC BLOOD PRESSURE: 44 MMHG | SYSTOLIC BLOOD PRESSURE: 133 MMHG

## 2020-03-05 VITALS — SYSTOLIC BLOOD PRESSURE: 152 MMHG | DIASTOLIC BLOOD PRESSURE: 51 MMHG

## 2020-03-05 VITALS — DIASTOLIC BLOOD PRESSURE: 30 MMHG | SYSTOLIC BLOOD PRESSURE: 86 MMHG

## 2020-03-05 VITALS — SYSTOLIC BLOOD PRESSURE: 115 MMHG | DIASTOLIC BLOOD PRESSURE: 53 MMHG

## 2020-03-05 VITALS — DIASTOLIC BLOOD PRESSURE: 69 MMHG | SYSTOLIC BLOOD PRESSURE: 183 MMHG

## 2020-03-05 VITALS — DIASTOLIC BLOOD PRESSURE: 66 MMHG | SYSTOLIC BLOOD PRESSURE: 119 MMHG

## 2020-03-05 VITALS — DIASTOLIC BLOOD PRESSURE: 38 MMHG | SYSTOLIC BLOOD PRESSURE: 110 MMHG

## 2020-03-05 VITALS — SYSTOLIC BLOOD PRESSURE: 124 MMHG | DIASTOLIC BLOOD PRESSURE: 52 MMHG

## 2020-03-05 VITALS — SYSTOLIC BLOOD PRESSURE: 141 MMHG | DIASTOLIC BLOOD PRESSURE: 47 MMHG

## 2020-03-05 VITALS — SYSTOLIC BLOOD PRESSURE: 140 MMHG | DIASTOLIC BLOOD PRESSURE: 44 MMHG

## 2020-03-05 LAB
ANION GAP SERPL CALC-SCNC: 21.5 MMOL/L (ref 8–16)
BASOPHILS NFR BLD AUTO: 0.3 % (ref 0–2)
BUN SERPL-MCNC: 52 MG/DL (ref 7–18)
CALCIUM SERPL-MCNC: 7.7 MG/DL (ref 8.5–10.1)
CHLORIDE SERPL-SCNC: 94 MMOL/L (ref 98–107)
CO2 SERPL-SCNC: 23.8 MMOL/L (ref 21–32)
CREAT SERPL-MCNC: 10.8 MG/DL (ref 0.6–1.3)
EOSINOPHIL NFR BLD: 10.8 % (ref 0–7)
ERYTHROCYTE [DISTWIDTH] IN BLOOD BY AUTOMATED COUNT: 15.6 % (ref 11.5–14.5)
GLUCOSE SERPL-MCNC: 79 MG/DL (ref 74–106)
HCT VFR BLD CALC: 37.1 % (ref 42–54)
HGB BLD-MCNC: 12.5 G/DL (ref 13.5–17.5)
IMM GRANULOCYTES NFR BLD: 0.3 % (ref 0–5)
LYMPHOCYTES NFR BLD AUTO: 12.6 % (ref 15–50)
MCH RBC QN AUTO: 30.3 PG (ref 26–34)
MCHC RBC AUTO-ENTMCNC: 33.7 G/DL (ref 31–37)
MCV RBC: 90 FL (ref 80–100)
MONOCYTES NFR BLD: 13.5 % (ref 2–11)
NEUTROPHILS NFR BLD AUTO: 62.5 % (ref 40–80)
OSMOLALITY SERPL CALC.SUM OF ELEC: 280 MOSM/KG (ref 275–300)
PHOSPHATE SERPL-MCNC: 6.8 MG/DL (ref 2.5–4.9)
PLATELET # BLD: 253 10X3/UL (ref 130–400)
PMV BLD AUTO: 11.5 FL (ref 7.4–10.4)
POTASSIUM SERPL-SCNC: 5.3 MMOL/L (ref 3.5–5.1)
RBC # BLD AUTO: 4.12 10X6/UL (ref 4.2–6.1)
SODIUM SERPL-SCNC: 134 MMOL/L (ref 136–145)
VANCOMYCIN SERPL-MCNC: 18.3 UG/ML (ref 10–20)
WBC # BLD AUTO: 5.9 10X3/UL (ref 4.8–10.8)

## 2020-03-06 VITALS — DIASTOLIC BLOOD PRESSURE: 54 MMHG | SYSTOLIC BLOOD PRESSURE: 140 MMHG

## 2020-03-06 VITALS — DIASTOLIC BLOOD PRESSURE: 78 MMHG | SYSTOLIC BLOOD PRESSURE: 119 MMHG

## 2020-03-06 VITALS — SYSTOLIC BLOOD PRESSURE: 138 MMHG | DIASTOLIC BLOOD PRESSURE: 51 MMHG

## 2020-03-06 VITALS — SYSTOLIC BLOOD PRESSURE: 126 MMHG | DIASTOLIC BLOOD PRESSURE: 65 MMHG

## 2020-03-06 VITALS — SYSTOLIC BLOOD PRESSURE: 133 MMHG | DIASTOLIC BLOOD PRESSURE: 68 MMHG

## 2020-03-06 VITALS — DIASTOLIC BLOOD PRESSURE: 89 MMHG | SYSTOLIC BLOOD PRESSURE: 117 MMHG

## 2020-03-06 VITALS — SYSTOLIC BLOOD PRESSURE: 139 MMHG | DIASTOLIC BLOOD PRESSURE: 85 MMHG

## 2020-03-06 VITALS — SYSTOLIC BLOOD PRESSURE: 109 MMHG | DIASTOLIC BLOOD PRESSURE: 70 MMHG

## 2020-03-06 VITALS — DIASTOLIC BLOOD PRESSURE: 78 MMHG | SYSTOLIC BLOOD PRESSURE: 132 MMHG

## 2020-03-06 VITALS — SYSTOLIC BLOOD PRESSURE: 145 MMHG | DIASTOLIC BLOOD PRESSURE: 81 MMHG

## 2020-03-06 VITALS — DIASTOLIC BLOOD PRESSURE: 37 MMHG | SYSTOLIC BLOOD PRESSURE: 143 MMHG

## 2020-03-06 VITALS — SYSTOLIC BLOOD PRESSURE: 120 MMHG | DIASTOLIC BLOOD PRESSURE: 68 MMHG

## 2020-03-06 VITALS — SYSTOLIC BLOOD PRESSURE: 113 MMHG | DIASTOLIC BLOOD PRESSURE: 45 MMHG

## 2020-03-06 VITALS — DIASTOLIC BLOOD PRESSURE: 61 MMHG | SYSTOLIC BLOOD PRESSURE: 113 MMHG

## 2020-03-06 VITALS — DIASTOLIC BLOOD PRESSURE: 43 MMHG | SYSTOLIC BLOOD PRESSURE: 141 MMHG

## 2020-03-06 VITALS — DIASTOLIC BLOOD PRESSURE: 79 MMHG | SYSTOLIC BLOOD PRESSURE: 149 MMHG

## 2020-03-06 VITALS — SYSTOLIC BLOOD PRESSURE: 125 MMHG | DIASTOLIC BLOOD PRESSURE: 77 MMHG

## 2020-03-06 VITALS — DIASTOLIC BLOOD PRESSURE: 55 MMHG | SYSTOLIC BLOOD PRESSURE: 133 MMHG

## 2020-03-06 VITALS — DIASTOLIC BLOOD PRESSURE: 71 MMHG | SYSTOLIC BLOOD PRESSURE: 125 MMHG

## 2020-03-06 VITALS — DIASTOLIC BLOOD PRESSURE: 29 MMHG | SYSTOLIC BLOOD PRESSURE: 96 MMHG

## 2020-03-06 VITALS — DIASTOLIC BLOOD PRESSURE: 70 MMHG | SYSTOLIC BLOOD PRESSURE: 115 MMHG

## 2020-03-06 VITALS — DIASTOLIC BLOOD PRESSURE: 59 MMHG | SYSTOLIC BLOOD PRESSURE: 132 MMHG

## 2020-03-06 VITALS — DIASTOLIC BLOOD PRESSURE: 48 MMHG | SYSTOLIC BLOOD PRESSURE: 130 MMHG

## 2020-03-06 VITALS — DIASTOLIC BLOOD PRESSURE: 69 MMHG | SYSTOLIC BLOOD PRESSURE: 133 MMHG

## 2020-03-06 LAB
ANION GAP SERPL CALC-SCNC: 22 MMOL/L (ref 8–16)
BUN SERPL-MCNC: 39 MG/DL (ref 7–18)
CALCIUM SERPL-MCNC: 8.3 MG/DL (ref 8.5–10.1)
CHLORIDE SERPL-SCNC: 94 MMOL/L (ref 98–107)
CO2 SERPL-SCNC: 22.2 MMOL/L (ref 21–32)
CREAT SERPL-MCNC: 9.2 MG/DL (ref 0.6–1.3)
ERYTHROCYTE [DISTWIDTH] IN BLOOD BY AUTOMATED COUNT: 15.8 % (ref 11.5–14.5)
GLUCOSE SERPL-MCNC: 85 MG/DL (ref 74–106)
HCT VFR BLD CALC: 37.2 % (ref 42–54)
HGB BLD-MCNC: 12.3 G/DL (ref 13.5–17.5)
LYMPHOCYTES NFR BLD AUTO: 6.5 % (ref 15–50)
MCH RBC QN AUTO: 29.9 PG (ref 26–34)
MCHC RBC AUTO-ENTMCNC: 33.1 G/DL (ref 31–37)
MCV RBC: 90.3 FL (ref 80–100)
NEUTROPHILS NFR BLD AUTO: 68.7 % (ref 40–80)
OSMOLALITY SERPL CALC.SUM OF ELEC: 275 MOSM/KG (ref 275–300)
PHOSPHATE SERPL-MCNC: 6.5 MG/DL (ref 2.5–4.9)
PLATELET # BLD: 248 10X3/UL (ref 130–400)
PMV BLD AUTO: 10.8 FL (ref 7.4–10.4)
POTASSIUM SERPL-SCNC: 4.2 MMOL/L (ref 3.5–5.1)
RBC # BLD AUTO: 4.12 10X6/UL (ref 4.2–6.1)
SODIUM SERPL-SCNC: 134 MMOL/L (ref 136–145)
VANCOMYCIN SERPL-MCNC: 17.3 UG/ML (ref 10–20)
WBC # BLD AUTO: 5.5 10X3/UL (ref 4.8–10.8)

## 2020-03-06 PROCEDURE — 0B9B8ZZ DRAINAGE OF LEFT LOWER LOBE BRONCHUS, VIA NATURAL OR ARTIFICIAL OPENING ENDOSCOPIC: ICD-10-PCS | Performed by: INTERNAL MEDICINE

## 2020-03-06 PROCEDURE — 0B968ZZ DRAINAGE OF RIGHT LOWER LOBE BRONCHUS, VIA NATURAL OR ARTIFICIAL OPENING ENDOSCOPIC: ICD-10-PCS | Performed by: INTERNAL MEDICINE

## 2020-03-06 NOTE — NUR
ABGS UNABLE TO OBTAIN X 2 MORNINGS. STUCK X 3 ATTEMPTS WITH DOPPLER. PT IS
RIGHT RESERVE AND HAS LEFT DIALYSIS ACCESS THAT IS NOT USED. PT IS IN NEED OF
AN ARTERIAL LINE.

## 2020-03-06 NOTE — NUR
PT IS RESTING IN BED INTUBATED/SEDATED. VITALS SIGNS ARE STABLE. BED IS
LOW,SIDE RAISLX2,CALL LIGHT WITHIN REACH. WILL CONTINUE TO MONITOR

## 2020-03-06 NOTE — NUR
RECIVED SHIFT REPORT AT BEDSIDE AT THIS TIME. PT IS INTUBATED/SEDATED.
OBSERVED RESTRAINTS WITH GOOD CIRCULATION. IV IS IN THE LEFT WRIST. I DID
NOTICE THAT THE PT LEFT ARE IS TWICE THE SIZE OF THE RIGHT. THERE IS A NEW
ORDER FOR AN ULTRA SOUND OF THE LEFT ARM PER . WILL BE WAITING ON
RESULTS. VITALS SIGNS ARE STABLE. THERE IS GOOD CAP REFILL TO BOTH HANDS
BILAT. WILL PERFORM FULL ASSESSMENT AND DOCUMENT. BED IS LOW,SIDE RAILSX2,CALL
LIGTH WITHIN REACH. WILL CONTINUE TO MONITOR

## 2020-03-06 NOTE — NUR
Nutrition follow-up:
Weaning trails started
Nepro off at this time
Labs reviewed
Wt: 247#
Pt also had some emesis
RDN following.

## 2020-03-06 NOTE — NUR
IS HERE AT THIS TIME. NOTIFIED HIM OF PT NEW DVT DIAGNOSIS TO HIS
LEFT ARM AND THAT HE IS A RIGHT ARM RESERVE. ART LINE IS NEEDED.  IS
CONFIRMING PLACEMENT

## 2020-03-06 NOTE — NUR
REPORT RECIEVED FROM THE OFF GOING RN. SEE ASSESSMENT IN THE PTS FLOW SHEET.
FAMILY AT THE PTS BEDSIDE. VSS AT THIS TIME. CALL LIGHT IN REACH. WILL CONT
POC.

## 2020-03-07 VITALS — SYSTOLIC BLOOD PRESSURE: 113 MMHG | DIASTOLIC BLOOD PRESSURE: 43 MMHG

## 2020-03-07 VITALS — SYSTOLIC BLOOD PRESSURE: 127 MMHG | DIASTOLIC BLOOD PRESSURE: 43 MMHG

## 2020-03-07 VITALS — SYSTOLIC BLOOD PRESSURE: 92 MMHG | DIASTOLIC BLOOD PRESSURE: 32 MMHG

## 2020-03-07 VITALS — SYSTOLIC BLOOD PRESSURE: 116 MMHG | DIASTOLIC BLOOD PRESSURE: 60 MMHG

## 2020-03-07 VITALS — SYSTOLIC BLOOD PRESSURE: 119 MMHG | DIASTOLIC BLOOD PRESSURE: 72 MMHG

## 2020-03-07 VITALS — DIASTOLIC BLOOD PRESSURE: 73 MMHG | SYSTOLIC BLOOD PRESSURE: 107 MMHG

## 2020-03-07 VITALS — SYSTOLIC BLOOD PRESSURE: 121 MMHG | DIASTOLIC BLOOD PRESSURE: 68 MMHG

## 2020-03-07 VITALS — SYSTOLIC BLOOD PRESSURE: 115 MMHG | DIASTOLIC BLOOD PRESSURE: 68 MMHG

## 2020-03-07 VITALS — SYSTOLIC BLOOD PRESSURE: 116 MMHG | DIASTOLIC BLOOD PRESSURE: 50 MMHG

## 2020-03-07 VITALS — DIASTOLIC BLOOD PRESSURE: 46 MMHG | SYSTOLIC BLOOD PRESSURE: 107 MMHG

## 2020-03-07 VITALS — DIASTOLIC BLOOD PRESSURE: 40 MMHG | SYSTOLIC BLOOD PRESSURE: 100 MMHG

## 2020-03-07 VITALS — SYSTOLIC BLOOD PRESSURE: 124 MMHG | DIASTOLIC BLOOD PRESSURE: 43 MMHG

## 2020-03-07 VITALS — SYSTOLIC BLOOD PRESSURE: 116 MMHG | DIASTOLIC BLOOD PRESSURE: 47 MMHG

## 2020-03-07 VITALS — DIASTOLIC BLOOD PRESSURE: 71 MMHG | SYSTOLIC BLOOD PRESSURE: 104 MMHG

## 2020-03-07 VITALS — DIASTOLIC BLOOD PRESSURE: 43 MMHG | SYSTOLIC BLOOD PRESSURE: 101 MMHG

## 2020-03-07 VITALS — SYSTOLIC BLOOD PRESSURE: 116 MMHG | DIASTOLIC BLOOD PRESSURE: 89 MMHG

## 2020-03-07 VITALS — SYSTOLIC BLOOD PRESSURE: 112 MMHG | DIASTOLIC BLOOD PRESSURE: 36 MMHG

## 2020-03-07 VITALS — SYSTOLIC BLOOD PRESSURE: 112 MMHG | DIASTOLIC BLOOD PRESSURE: 58 MMHG

## 2020-03-07 VITALS — SYSTOLIC BLOOD PRESSURE: 94 MMHG | DIASTOLIC BLOOD PRESSURE: 43 MMHG

## 2020-03-07 VITALS — SYSTOLIC BLOOD PRESSURE: 128 MMHG | DIASTOLIC BLOOD PRESSURE: 57 MMHG

## 2020-03-07 VITALS — DIASTOLIC BLOOD PRESSURE: 48 MMHG | SYSTOLIC BLOOD PRESSURE: 116 MMHG

## 2020-03-07 VITALS — DIASTOLIC BLOOD PRESSURE: 47 MMHG | SYSTOLIC BLOOD PRESSURE: 102 MMHG

## 2020-03-07 VITALS — DIASTOLIC BLOOD PRESSURE: 58 MMHG | SYSTOLIC BLOOD PRESSURE: 117 MMHG

## 2020-03-07 VITALS — SYSTOLIC BLOOD PRESSURE: 102 MMHG | DIASTOLIC BLOOD PRESSURE: 38 MMHG

## 2020-03-07 LAB
ANION GAP SERPL CALC-SCNC: 16.6 MMOL/L (ref 8–16)
BASOPHILS NFR BLD AUTO: 0.3 % (ref 0–2)
BUN SERPL-MCNC: 28 MG/DL (ref 7–18)
CALCIUM SERPL-MCNC: 8.4 MG/DL (ref 8.5–10.1)
CHLORIDE SERPL-SCNC: 95 MMOL/L (ref 98–107)
CO2 SERPL-SCNC: 25 MMOL/L (ref 21–32)
CREAT SERPL-MCNC: 7.2 MG/DL (ref 0.6–1.3)
EOSINOPHIL NFR BLD: 7.3 % (ref 0–7)
ERYTHROCYTE [DISTWIDTH] IN BLOOD BY AUTOMATED COUNT: 15.8 % (ref 11.5–14.5)
GLUCOSE SERPL-MCNC: 121 MG/DL (ref 74–106)
HCT VFR BLD CALC: 35.2 % (ref 42–54)
HGB BLD-MCNC: 11.8 G/DL (ref 13.5–17.5)
IMM GRANULOCYTES NFR BLD: 1.2 % (ref 0–5)
LYMPHOCYTES NFR BLD AUTO: 14.7 % (ref 15–50)
MCH RBC QN AUTO: 30.5 PG (ref 26–34)
MCHC RBC AUTO-ENTMCNC: 33.5 G/DL (ref 31–37)
MCV RBC: 91 FL (ref 80–100)
MONOCYTES NFR BLD: 14.5 % (ref 2–11)
NEUTROPHILS NFR BLD AUTO: 62 % (ref 40–80)
OSMOLALITY SERPL CALC.SUM OF ELEC: 272 MOSM/KG (ref 275–300)
PHOSPHATE SERPL-MCNC: 5 MG/DL (ref 2.5–4.9)
PLATELET # BLD: 268 10X3/UL (ref 130–400)
PMV BLD AUTO: 11.1 FL (ref 7.4–10.4)
POTASSIUM SERPL-SCNC: 3.6 MMOL/L (ref 3.5–5.1)
RBC # BLD AUTO: 3.87 10X6/UL (ref 4.2–6.1)
SODIUM SERPL-SCNC: 133 MMOL/L (ref 136–145)
SPECIMEN PREPARATION: (no result)
VANCOMYCIN SERPL-MCNC: 14.3 UG/ML (ref 10–20)
WBC # BLD AUTO: 7.2 10X3/UL (ref 4.8–10.8)

## 2020-03-07 PROCEDURE — 05HM33Z INSERTION OF INFUSION DEVICE INTO RIGHT INTERNAL JUGULAR VEIN, PERCUTANEOUS APPROACH: ICD-10-PCS | Performed by: SURGERY

## 2020-03-07 NOTE — NUR
PT HAD ANOTHER LOOSE SOFT BM BROWN IN COLOR LARGE AMOUNT. CLEANED PT AND
CHANGED ALL LINENS AND GOWN. PT TOLERATED WELL. VITAL SIGNS REMAINED STABLE.
BED IS LOW,SIDE RAILSX2,CALL LIGHT WTIHIN REACH. WILL CONTINUE TO MONITOR

## 2020-03-07 NOTE — NUR
DR DENNIS HAS SPOKEN WITH FAMILY AT THIS TIME. QUESTIONS ANSWERED, FAMILY
DENEIS ANY CURRENT QUESTIONS OR CONCERNS.

## 2020-03-07 NOTE — NUR
CHANGED FEEDING TUBE SYSTEM AT THIS TIME. CHECK RESDIUAL AND THERE WAS LESS
THAN 10ML. RESTARTED AS ORDERED. TURNED PT TO RIGHT SIDE AT THIS TIME, WHILE
ELEVATING LEFT ARM ON PILLOW AND ELEVATING BILAT FEET TO SUPPORT BACK OF KNEES
AND BRIDGE HEELS OFF OF BED. VITALS ARE STABLE. BED IS LOW,SIDE RAISLX2,CALL
LIGHT WITHIN REACH. WILL CONTINUE TO MONITOR

## 2020-03-07 NOTE — NUR
PER LAB; UNABLE TO DRAW BLOOD AFTER MULTIPLE ATTEMPTS. DIALYSIS NURSE IN ROOM,
STATED OKAY TO DRAW LAB DURING DIALYSIS. VSS. NO ACUTE DISTRESS NOTED.
WILL CONTINUE PLAN OF CARE.

## 2020-03-07 NOTE — NUR
UP IN BED ON VENT AT THIS TIME. VSS. NO ACUTE DISTERSS NOTED. DIALYSIS
COMPLETE. PT TURNED Q2H. ORAL CARE PROVIDED Q2H. WILL CONTINUE PLAN OF CARE.

## 2020-03-07 NOTE — NUR
LYING IN BED ON VENT AT THIS TIME. WIFE AT BEDSIDE. VSS. NO ACUTE DISTRESS
NOTED. PT TURNED Q2H. ORAL CARE PROVIDED Q2H. PT AROUSES TO DISCOMFORT. WILL
CONTINUE PLAN OF CARE.

## 2020-03-07 NOTE — NUR
PT IS RESTING IN BED WITH EYES CLOSED. INTUBATED/SEDATED. VSS. BED IS LOW,SIDE
RAISLX2,CALL LIGHT WITHIN REACH. WILL CONTINUE TO MONITOR

## 2020-03-07 NOTE — NUR
FAMILY AT BEDSIDE AT THIS TIME, QUESTIONS ASKED, PT FAMILY REQUEST TO SPEAK
WITH A PHYSICIAN. DR DENNIS PAGED AT THIS TIME. WAITING FOR CALLBACK.

## 2020-03-07 NOTE — NUR
REASSESSMENT COMPLETED, NGT FOUND IN FLOOR, 16FR NGT INSERTED VIA RIGHT NARE
AND PLACEMENT VERIFIED VIA SM AIR BOLUS AUSCULTATED OVER EPIGASTRIM, NEPRO
RESUMED AT 30CC/HR, PT TOLERATED WELL SEDATED ON VENT, BP STABLE, WILL CONT TO
MONITOR FOR CHANGES.

## 2020-03-07 NOTE — NUR
REPORT REC'D  AND CARE ASSUMED, REC'D PT SEDATED ON VENT VIA 7.5 ETT TAPED
SECURELY @ 29CM LIPLINE, SEE FLOWSHEET FOR VENT SETTINGS, PT AROUSABLE TO DEEP
STIMULI, DOES NOT FOLLOW COMMANDS, RIJTL DRSG CDI, RIGHT FOREARM PIV WITH NS @
5CC/HR, FENTANYL @ 150MCG/HR, AND DIPRIVAN @ 28MCG/KG/MIN, RIGHT NARE NGT WITH
NEPRO @ 30CC/HR, NGT TAPED SECURELY, PLACEMENT VERIFIED VIA SM AIR BOLUS
AUSCULTATED OVER EPIGASTRIM, ABD SOFT, RIGHT ARM FISTULA PALPABLE THRILL AND
BRUIT AUSCULTATED, PPP, BED IN LOW POSITION, SR UP X 2, BILAT SOFT WRIST
RESTRAINTS INTACT.

## 2020-03-07 NOTE — NUR
PERFORMED RE-ASSESSMENT AT THIS TIME. ALSO DID COMPLETE BED BATH C CHG
. PT HAD SMALL BM THAT WAS VERY SOFT BROWN IN COLOR IN BED. CLEANED
ENTIRE BODY AND COMPLETE LINEN CHANGE AT THIS TIME. APPLIED NEW MEPAPLEX
DRESSING TO COCCYX AREA. UN TIED RESTRAINTS WHILE GIVNING BATH AND REAPPLIED
AFTER DONE. PROPPED PT TO RIGHT SIDE WITH LEFT ARM ELEVATED ON PILLOW. I ALSO
ELEVATED PT LOWER LEGS BILAT TO HAVE HEALS BRIDGED OFF OF BED. PERFORMED ORAL
CARE AND SUCTION BEFORE BED BATH. VS REMAINED STABLE. I ALSO HELD TUBE FEEDING
BEFORE BATH AND RESTARTED AFTER FINSHED. BED IS IN LOW POSITION. SIDE RAISLX2,
CALL LIGHT WITHIN REACH. WILL CONITNUE TO MONITOR

## 2020-03-07 NOTE — NUR
PT IS RESTING IN BED WITH EYES CLOSED. PT IS INTUBATED/SEDATED. VSS. BED IS
LOW,SIDE RAILSX2,CALL LIGHT WITHIN REACH. WILL CONTINUE TO MONITOR

## 2020-03-08 VITALS — SYSTOLIC BLOOD PRESSURE: 92 MMHG | DIASTOLIC BLOOD PRESSURE: 60 MMHG

## 2020-03-08 VITALS — DIASTOLIC BLOOD PRESSURE: 49 MMHG | SYSTOLIC BLOOD PRESSURE: 103 MMHG

## 2020-03-08 VITALS — SYSTOLIC BLOOD PRESSURE: 98 MMHG | DIASTOLIC BLOOD PRESSURE: 32 MMHG

## 2020-03-08 VITALS — SYSTOLIC BLOOD PRESSURE: 140 MMHG | DIASTOLIC BLOOD PRESSURE: 77 MMHG

## 2020-03-08 VITALS — SYSTOLIC BLOOD PRESSURE: 147 MMHG | DIASTOLIC BLOOD PRESSURE: 62 MMHG

## 2020-03-08 VITALS — SYSTOLIC BLOOD PRESSURE: 102 MMHG | DIASTOLIC BLOOD PRESSURE: 46 MMHG

## 2020-03-08 VITALS — SYSTOLIC BLOOD PRESSURE: 105 MMHG | DIASTOLIC BLOOD PRESSURE: 48 MMHG

## 2020-03-08 VITALS — DIASTOLIC BLOOD PRESSURE: 68 MMHG | SYSTOLIC BLOOD PRESSURE: 124 MMHG

## 2020-03-08 VITALS — SYSTOLIC BLOOD PRESSURE: 96 MMHG | DIASTOLIC BLOOD PRESSURE: 40 MMHG

## 2020-03-08 VITALS — SYSTOLIC BLOOD PRESSURE: 154 MMHG | DIASTOLIC BLOOD PRESSURE: 65 MMHG

## 2020-03-08 VITALS — DIASTOLIC BLOOD PRESSURE: 69 MMHG | SYSTOLIC BLOOD PRESSURE: 139 MMHG

## 2020-03-08 VITALS — SYSTOLIC BLOOD PRESSURE: 129 MMHG | DIASTOLIC BLOOD PRESSURE: 62 MMHG

## 2020-03-08 VITALS — DIASTOLIC BLOOD PRESSURE: 73 MMHG | SYSTOLIC BLOOD PRESSURE: 136 MMHG

## 2020-03-08 VITALS — SYSTOLIC BLOOD PRESSURE: 106 MMHG | DIASTOLIC BLOOD PRESSURE: 51 MMHG

## 2020-03-08 VITALS — DIASTOLIC BLOOD PRESSURE: 69 MMHG | SYSTOLIC BLOOD PRESSURE: 140 MMHG

## 2020-03-08 VITALS — SYSTOLIC BLOOD PRESSURE: 107 MMHG | DIASTOLIC BLOOD PRESSURE: 49 MMHG

## 2020-03-08 VITALS — DIASTOLIC BLOOD PRESSURE: 58 MMHG | SYSTOLIC BLOOD PRESSURE: 125 MMHG

## 2020-03-08 VITALS — SYSTOLIC BLOOD PRESSURE: 123 MMHG | DIASTOLIC BLOOD PRESSURE: 55 MMHG

## 2020-03-08 VITALS — DIASTOLIC BLOOD PRESSURE: 65 MMHG | SYSTOLIC BLOOD PRESSURE: 146 MMHG

## 2020-03-08 VITALS — DIASTOLIC BLOOD PRESSURE: 47 MMHG | SYSTOLIC BLOOD PRESSURE: 103 MMHG

## 2020-03-08 VITALS — SYSTOLIC BLOOD PRESSURE: 135 MMHG | DIASTOLIC BLOOD PRESSURE: 51 MMHG

## 2020-03-08 VITALS — DIASTOLIC BLOOD PRESSURE: 56 MMHG | SYSTOLIC BLOOD PRESSURE: 126 MMHG

## 2020-03-08 VITALS — DIASTOLIC BLOOD PRESSURE: 35 MMHG | SYSTOLIC BLOOD PRESSURE: 97 MMHG

## 2020-03-08 VITALS — DIASTOLIC BLOOD PRESSURE: 49 MMHG | SYSTOLIC BLOOD PRESSURE: 100 MMHG

## 2020-03-08 LAB
ALBUMIN SERPL-MCNC: 1.6 G/DL (ref 3.4–5)
ALP SERPL-CCNC: 72 U/L (ref 30–120)
ALT SERPL-CCNC: 24 U/L (ref 10–68)
ANION GAP SERPL CALC-SCNC: 14.8 MMOL/L (ref 8–16)
BASOPHILS NFR BLD AUTO: 0.4 % (ref 0–2)
BILIRUB SERPL-MCNC: 0.54 MG/DL (ref 0.2–1.3)
BUN SERPL-MCNC: 23 MG/DL (ref 7–18)
CALCIUM SERPL-MCNC: 8.4 MG/DL (ref 8.5–10.1)
CHLORIDE SERPL-SCNC: 92 MMOL/L (ref 98–107)
CO2 SERPL-SCNC: 26.5 MMOL/L (ref 21–32)
CREAT SERPL-MCNC: 5.9 MG/DL (ref 0.6–1.3)
EOSINOPHIL NFR BLD: 5.9 % (ref 0–7)
ERYTHROCYTE [DISTWIDTH] IN BLOOD BY AUTOMATED COUNT: 15.6 % (ref 11.5–14.5)
GLOBULIN SER-MCNC: 5.1 G/L
GLUCOSE SERPL-MCNC: 115 MG/DL (ref 74–106)
HCT VFR BLD CALC: 33.6 % (ref 42–54)
HGB BLD-MCNC: 11.1 G/DL (ref 13.5–17.5)
IMM GRANULOCYTES NFR BLD: 1.1 % (ref 0–5)
LYMPHOCYTES NFR BLD AUTO: 8.4 % (ref 15–50)
MAGNESIUM SERPL-MCNC: 1.9 MG/DL (ref 1.8–2.4)
MCH RBC QN AUTO: 30 PG (ref 26–34)
MCHC RBC AUTO-ENTMCNC: 33 G/DL (ref 31–37)
MCV RBC: 90.8 FL (ref 80–100)
MONOCYTES NFR BLD: 19.4 % (ref 2–11)
NEUTROPHILS NFR BLD AUTO: 64.8 % (ref 40–80)
OSMOLALITY SERPL CALC.SUM OF ELEC: 265 MOSM/KG (ref 275–300)
PHOSPHATE SERPL-MCNC: 3.9 MG/DL (ref 2.5–4.9)
PLATELET # BLD: 271 10X3/UL (ref 130–400)
PMV BLD AUTO: 10.9 FL (ref 7.4–10.4)
POTASSIUM SERPL-SCNC: 3.3 MMOL/L (ref 3.5–5.1)
PROT SERPL-MCNC: 6.7 G/DL (ref 6.4–8.2)
RBC # BLD AUTO: 3.7 10X6/UL (ref 4.2–6.1)
SODIUM SERPL-SCNC: 130 MMOL/L (ref 136–145)
WBC # BLD AUTO: 8.5 10X3/UL (ref 4.8–10.8)

## 2020-03-08 NOTE — OP
PATIENT NAME:  BALA SCHULTZ                        MEDICAL RECORD: [id number]
:57                                             LOCATION:D.[facility]    D.2310
                                                         ADMISSION DATE:20
SURGEON:  BREANN MUHAMMAD MD             
 
 
DATE OF OPERATION:  2020
 
PROCEDURES:
1.  PTCA stent RCA.
2.  Left heart catheterization.
3.  Selective coronary angiography.
4.  Left ventriculogram.
 
INDICATION:  Myocardial infarction.
 
DESCRIPTION OF PROCEDURE IN DETAIL:  After informed consent was obtained and
after a detailed description of risks, benefits as well as alternative
therapies, the patient elected to proceed with angiogram and angioplasty.  The
right femoral area was prepped and draped in normal sterile fashion.  Right
femoral artery was cannulated via modified Seldinger technique with placement of
6-Wolof sheath.  All catheters exchanged through this sheath.
 
FINDINGS:  Left ventriculogram was performed in standard 30-degree MEDRANO view,
reveals global hypokinesis, ejection fraction in the 30% to 35% range.
 
SELECTIVE CORONARY ANGIOGRAPHY:
1.  Left main has a distal 90% stenosis.  This leads into the ostium of the LAD.
2.  Left anterior descending:  This has a 95% stenosis in the mid vessel.
3.  Left circumflex has at least 70% stenosis proximally.
4.  The right coronary has multiple areas of 95%-99% stenosis.
 
PTCA STENT OF THE RCA:  The stent used was a 3.0 x 38 and 2.0 x 15, both [place]
stents.  Result was 0% residual stenosis.
 
OVERALL IMPRESSION:  Successful percutaneous transluminal coronary angioplasty
stent of the right coronary artery going from 95+ percent initial stenosis to 0%
residual.
 
PLAN:  PTCA stent LAD, left main and circumflex in the near future.
 
TRANSINT:[id number] Voice Confirmation ID: [id number] DOCUMENT ID: [id number]
                                           
                                           BREANN MUHAMMAD MD             
 
 
 
Electronically Signed by BREANN MUHAMMAD on 20 at 1110
 
 
CC:                                                             [id number]
DICTATION DATE: 20 1333     :     20 1844      ADM IN  
                                                                              
[id number] [place] [address]

## 2020-03-08 NOTE — NUR
NOTED TUBE FEEDS DRAINING FROM PTS MOUTH. TUBE FEEDS TURNED OFF. SUCTIONING
PROVIDED. NGT PLACEMENT VERIFICATION ASSESSED AND CONFIRMED VIA AUSCULTATION
AND NOTED PER THIS ASSESSMENT SEEMS IN PLACE. XR CHEST ORDER RECIEVED TO BE
SURE NGT IS IN PROPER PLACE. VSS. WIFE AT BEDSIDE. WILL CONTINUE PLAN OF CARE.

## 2020-03-08 NOTE — NUR
FAMILY AT BEDSIDE. VSS. NO ACUTE DISTRESS NOTED. PT TURNED Q2H. ORAL CARE
PROVIDED Q2H. WILL CONTINUE PLAN OF CARE.

## 2020-03-08 NOTE — NUR
LYING IN BED ON VENT AT THIS TIME. PT SEDATED, RESPONDS TO DISCOMFORT. NGT
PLACEMENT VERIFIED VIA AUSCULTATION, 5ML RESIDUAL NOTED. NO ACUTE DISTRESS
NOTED. WILL CONTINUE PLAN OF CARE.

## 2020-03-08 NOTE — NUR
REPORT RECIEVED, SHIFT ASSESSMENT COMPLETE, PT IS SEDATED ON VENT, RESPONDS TO
PAINFUL STIMULI, ALL PPP, VSS, WILL CON'T TO MONITOR

## 2020-03-08 NOTE — NUR
REASSESSMENT COMPLETED, PT REPOSITIONED UP AND ONTO RIGHT SIDE SUPPORTED WITH
PILLOWS, ORAL CARE PROVIDED, WILL CONT TO MONITOR FOR CHANGES.

## 2020-03-08 NOTE — NUR
FAMILY AT BEDSIDE AT THIS TIME. VSS. NO ACUTE DISTRESS NOTED. CALL LIGHT IN
REACH. WILL CONTINUE PLAN OF CARE.

## 2020-03-09 VITALS — SYSTOLIC BLOOD PRESSURE: 151 MMHG | DIASTOLIC BLOOD PRESSURE: 81 MMHG

## 2020-03-09 VITALS — DIASTOLIC BLOOD PRESSURE: 68 MMHG | SYSTOLIC BLOOD PRESSURE: 155 MMHG

## 2020-03-09 VITALS — DIASTOLIC BLOOD PRESSURE: 60 MMHG | SYSTOLIC BLOOD PRESSURE: 130 MMHG

## 2020-03-09 VITALS — DIASTOLIC BLOOD PRESSURE: 72 MMHG | SYSTOLIC BLOOD PRESSURE: 145 MMHG

## 2020-03-09 VITALS — DIASTOLIC BLOOD PRESSURE: 90 MMHG | SYSTOLIC BLOOD PRESSURE: 151 MMHG

## 2020-03-09 VITALS — SYSTOLIC BLOOD PRESSURE: 153 MMHG | DIASTOLIC BLOOD PRESSURE: 81 MMHG

## 2020-03-09 VITALS — DIASTOLIC BLOOD PRESSURE: 67 MMHG | SYSTOLIC BLOOD PRESSURE: 137 MMHG

## 2020-03-09 VITALS — SYSTOLIC BLOOD PRESSURE: 184 MMHG | DIASTOLIC BLOOD PRESSURE: 86 MMHG

## 2020-03-09 VITALS — SYSTOLIC BLOOD PRESSURE: 174 MMHG | DIASTOLIC BLOOD PRESSURE: 74 MMHG

## 2020-03-09 VITALS — SYSTOLIC BLOOD PRESSURE: 151 MMHG | DIASTOLIC BLOOD PRESSURE: 71 MMHG

## 2020-03-09 VITALS — DIASTOLIC BLOOD PRESSURE: 74 MMHG | SYSTOLIC BLOOD PRESSURE: 147 MMHG

## 2020-03-09 VITALS — DIASTOLIC BLOOD PRESSURE: 86 MMHG | SYSTOLIC BLOOD PRESSURE: 156 MMHG

## 2020-03-09 VITALS — SYSTOLIC BLOOD PRESSURE: 142 MMHG | DIASTOLIC BLOOD PRESSURE: 67 MMHG

## 2020-03-09 VITALS — DIASTOLIC BLOOD PRESSURE: 79 MMHG | SYSTOLIC BLOOD PRESSURE: 164 MMHG

## 2020-03-09 VITALS — SYSTOLIC BLOOD PRESSURE: 141 MMHG | DIASTOLIC BLOOD PRESSURE: 72 MMHG

## 2020-03-09 VITALS — DIASTOLIC BLOOD PRESSURE: 69 MMHG | SYSTOLIC BLOOD PRESSURE: 113 MMHG

## 2020-03-09 VITALS — SYSTOLIC BLOOD PRESSURE: 158 MMHG | DIASTOLIC BLOOD PRESSURE: 80 MMHG

## 2020-03-09 VITALS — DIASTOLIC BLOOD PRESSURE: 68 MMHG | SYSTOLIC BLOOD PRESSURE: 166 MMHG

## 2020-03-09 VITALS — SYSTOLIC BLOOD PRESSURE: 153 MMHG | DIASTOLIC BLOOD PRESSURE: 58 MMHG

## 2020-03-09 VITALS — DIASTOLIC BLOOD PRESSURE: 83 MMHG | SYSTOLIC BLOOD PRESSURE: 154 MMHG

## 2020-03-09 VITALS — SYSTOLIC BLOOD PRESSURE: 141 MMHG | DIASTOLIC BLOOD PRESSURE: 71 MMHG

## 2020-03-09 VITALS — DIASTOLIC BLOOD PRESSURE: 53 MMHG | SYSTOLIC BLOOD PRESSURE: 159 MMHG

## 2020-03-09 VITALS — SYSTOLIC BLOOD PRESSURE: 159 MMHG | DIASTOLIC BLOOD PRESSURE: 76 MMHG

## 2020-03-09 LAB
GENTAMICIN SERPL-MCNC: 2.6 UG/ML (ref 0.5–2)
PHOSPHATE SERPL-MCNC: 5.1 MG/DL (ref 2.5–4.9)
VANCOMYCIN SERPL-MCNC: 19.7 UG/ML (ref 10–20)

## 2020-03-09 NOTE — NUR
REPORT RECIEVED, SHIFT ASSESSMENT COMPLETE, PT IS SEDATED ON VENT, ON 40% FIO2
WITH 97% O2 SAT, ALL PPP, VSS, WILL CON'T TO MONITOR

## 2020-03-09 NOTE — OP
PATIENT NAME:  BALA SCHULTZ                        MEDICAL RECORD: [id number]
:57                                             LOCATION:D.[facility]    D.2310
                                                         ADMISSION DATE:20
SURGEON:  BREANN MUHAMMAD MD             
 
 
DATE OF OPERATION:  2020
 
PROCEDURES:
1.  PTCA and stent of left main.
2.  PTCA and stent of left circumflex.
3.  PTCA and stent of LAD.
4.  Selective coronary angiography.
 
INDICATIONS:  Angina, coronary artery disease, non-Q-wave myocardial infarction.
 
DESCRIPTION OF PROCEDURE:  After informed consent was obtained and after
detailed explanation of risks, benefits as well as alternative therapies, the
patient elected to proceed with angiogram and angioplasty.  The right femoral
area was prepped and draped in normal sterile fashion.  Right femoral artery was
cannulated via modified Seldinger technique with placement of 6-Croatian sheath. 
All catheters exchanged through this sheath.
 
FINDINGS:  The left main has 90% stenosis.  Left anterior descending has 90%
stenosis.  Left circumflex has 80% stenosis.
 
The left main was addressed with a 3.0 x 12-mm Cameron, the left circumflex with a
3.0 x 8-mm Cameron, the LAD with 3.0 x 15-mm Cameron.  Result was 0% residual
throughout.
 
OVERALL IMPRESSION:  Successful PTCA and stent of the left main, LAD, and left
circumflex, all going from 80% to 90% initial stenosis to 0% residual.
 
TRANSINT:[id number] Voice Confirmation ID: [id number] DOCUMENT ID: [id number]
                                           
                                           BREANN MUHAMMAD MD             
 
 
 
Electronically Signed by BREANN MUHAMMAD on 20 at 1453
 
 
 
 
 
 
 
 
 
 
CC:                                                             [id number]
DICTATION DATE: 20     :     20 1135      ADM IN  
                                                                              
[id number] [place] [id number]

## 2020-03-09 NOTE — NUR
Nutrition follow-up:
CPAP trials
NGT with Nepro; off at this time
Goal rate 50 ml/hr
Wt: 249#
Labs reviewed
RDN following.

## 2020-03-10 VITALS — SYSTOLIC BLOOD PRESSURE: 104 MMHG | DIASTOLIC BLOOD PRESSURE: 61 MMHG

## 2020-03-10 VITALS — DIASTOLIC BLOOD PRESSURE: 82 MMHG | SYSTOLIC BLOOD PRESSURE: 154 MMHG

## 2020-03-10 VITALS — SYSTOLIC BLOOD PRESSURE: 149 MMHG | DIASTOLIC BLOOD PRESSURE: 91 MMHG

## 2020-03-10 VITALS — SYSTOLIC BLOOD PRESSURE: 133 MMHG | DIASTOLIC BLOOD PRESSURE: 69 MMHG

## 2020-03-10 VITALS — DIASTOLIC BLOOD PRESSURE: 69 MMHG | SYSTOLIC BLOOD PRESSURE: 167 MMHG

## 2020-03-10 VITALS — SYSTOLIC BLOOD PRESSURE: 122 MMHG | DIASTOLIC BLOOD PRESSURE: 62 MMHG

## 2020-03-10 VITALS — SYSTOLIC BLOOD PRESSURE: 163 MMHG | DIASTOLIC BLOOD PRESSURE: 83 MMHG

## 2020-03-10 VITALS — SYSTOLIC BLOOD PRESSURE: 145 MMHG | DIASTOLIC BLOOD PRESSURE: 89 MMHG

## 2020-03-10 VITALS — SYSTOLIC BLOOD PRESSURE: 160 MMHG | DIASTOLIC BLOOD PRESSURE: 94 MMHG

## 2020-03-10 VITALS — DIASTOLIC BLOOD PRESSURE: 79 MMHG | SYSTOLIC BLOOD PRESSURE: 166 MMHG

## 2020-03-10 VITALS — SYSTOLIC BLOOD PRESSURE: 146 MMHG | DIASTOLIC BLOOD PRESSURE: 69 MMHG

## 2020-03-10 VITALS — SYSTOLIC BLOOD PRESSURE: 154 MMHG | DIASTOLIC BLOOD PRESSURE: 96 MMHG

## 2020-03-10 VITALS — SYSTOLIC BLOOD PRESSURE: 179 MMHG | DIASTOLIC BLOOD PRESSURE: 60 MMHG

## 2020-03-10 VITALS — DIASTOLIC BLOOD PRESSURE: 51 MMHG | SYSTOLIC BLOOD PRESSURE: 132 MMHG

## 2020-03-10 VITALS — SYSTOLIC BLOOD PRESSURE: 150 MMHG | DIASTOLIC BLOOD PRESSURE: 93 MMHG

## 2020-03-10 VITALS — DIASTOLIC BLOOD PRESSURE: 80 MMHG | SYSTOLIC BLOOD PRESSURE: 154 MMHG

## 2020-03-10 VITALS — SYSTOLIC BLOOD PRESSURE: 154 MMHG | DIASTOLIC BLOOD PRESSURE: 74 MMHG

## 2020-03-10 VITALS — SYSTOLIC BLOOD PRESSURE: 168 MMHG | DIASTOLIC BLOOD PRESSURE: 93 MMHG

## 2020-03-10 VITALS — DIASTOLIC BLOOD PRESSURE: 72 MMHG | SYSTOLIC BLOOD PRESSURE: 110 MMHG

## 2020-03-10 VITALS — DIASTOLIC BLOOD PRESSURE: 62 MMHG | SYSTOLIC BLOOD PRESSURE: 125 MMHG

## 2020-03-10 VITALS — SYSTOLIC BLOOD PRESSURE: 189 MMHG | DIASTOLIC BLOOD PRESSURE: 81 MMHG

## 2020-03-10 VITALS — DIASTOLIC BLOOD PRESSURE: 83 MMHG | SYSTOLIC BLOOD PRESSURE: 155 MMHG

## 2020-03-10 VITALS — SYSTOLIC BLOOD PRESSURE: 124 MMHG | DIASTOLIC BLOOD PRESSURE: 72 MMHG

## 2020-03-10 VITALS — DIASTOLIC BLOOD PRESSURE: 54 MMHG | SYSTOLIC BLOOD PRESSURE: 103 MMHG

## 2020-03-10 LAB
ANION GAP SERPL CALC-SCNC: 15 MMOL/L (ref 8–16)
BASOPHILS NFR BLD AUTO: 0.4 % (ref 0–2)
BUN SERPL-MCNC: 38 MG/DL (ref 7–18)
CALCIUM SERPL-MCNC: 8.9 MG/DL (ref 8.5–10.1)
CHLORIDE SERPL-SCNC: 93 MMOL/L (ref 98–107)
CO2 SERPL-SCNC: 26.6 MMOL/L (ref 21–32)
CREAT SERPL-MCNC: 8.4 MG/DL (ref 0.6–1.3)
EOSINOPHIL NFR BLD: 10.2 % (ref 0–7)
ERYTHROCYTE [DISTWIDTH] IN BLOOD BY AUTOMATED COUNT: 15.7 % (ref 11.5–14.5)
GLUCOSE SERPL-MCNC: 101 MG/DL (ref 74–106)
HCT VFR BLD CALC: 33.8 % (ref 42–54)
HGB BLD-MCNC: 11.1 G/DL (ref 13.5–17.5)
IMM GRANULOCYTES NFR BLD: 0.8 % (ref 0–5)
LYMPHOCYTES NFR BLD AUTO: 17.3 % (ref 15–50)
MCH RBC QN AUTO: 29.4 PG (ref 26–34)
MCHC RBC AUTO-ENTMCNC: 32.8 G/DL (ref 31–37)
MCV RBC: 89.4 FL (ref 80–100)
MONOCYTES NFR BLD: 13.9 % (ref 2–11)
NEUTROPHILS NFR BLD AUTO: 57.4 % (ref 40–80)
OSMOLALITY SERPL CALC.SUM OF ELEC: 271 MOSM/KG (ref 275–300)
PHOSPHATE SERPL-MCNC: 5.6 MG/DL (ref 2.5–4.9)
PLATELET # BLD: 305 10X3/UL (ref 130–400)
PMV BLD AUTO: 10.6 FL (ref 7.4–10.4)
POTASSIUM SERPL-SCNC: 3.6 MMOL/L (ref 3.5–5.1)
RBC # BLD AUTO: 3.78 10X6/UL (ref 4.2–6.1)
SODIUM SERPL-SCNC: 131 MMOL/L (ref 136–145)
WBC # BLD AUTO: 7.3 10X3/UL (ref 4.8–10.8)

## 2020-03-11 VITALS — SYSTOLIC BLOOD PRESSURE: 211 MMHG | DIASTOLIC BLOOD PRESSURE: 81 MMHG

## 2020-03-11 VITALS — DIASTOLIC BLOOD PRESSURE: 62 MMHG | SYSTOLIC BLOOD PRESSURE: 175 MMHG

## 2020-03-11 VITALS — SYSTOLIC BLOOD PRESSURE: 162 MMHG | DIASTOLIC BLOOD PRESSURE: 74 MMHG

## 2020-03-11 VITALS — SYSTOLIC BLOOD PRESSURE: 185 MMHG | DIASTOLIC BLOOD PRESSURE: 85 MMHG

## 2020-03-11 VITALS — SYSTOLIC BLOOD PRESSURE: 160 MMHG | DIASTOLIC BLOOD PRESSURE: 92 MMHG

## 2020-03-11 VITALS — SYSTOLIC BLOOD PRESSURE: 155 MMHG | DIASTOLIC BLOOD PRESSURE: 59 MMHG

## 2020-03-11 VITALS — SYSTOLIC BLOOD PRESSURE: 158 MMHG | DIASTOLIC BLOOD PRESSURE: 79 MMHG

## 2020-03-11 VITALS — DIASTOLIC BLOOD PRESSURE: 69 MMHG | SYSTOLIC BLOOD PRESSURE: 170 MMHG

## 2020-03-11 VITALS — SYSTOLIC BLOOD PRESSURE: 171 MMHG | DIASTOLIC BLOOD PRESSURE: 72 MMHG

## 2020-03-11 VITALS — SYSTOLIC BLOOD PRESSURE: 183 MMHG | DIASTOLIC BLOOD PRESSURE: 90 MMHG

## 2020-03-11 VITALS — DIASTOLIC BLOOD PRESSURE: 66 MMHG | SYSTOLIC BLOOD PRESSURE: 169 MMHG

## 2020-03-11 VITALS — DIASTOLIC BLOOD PRESSURE: 86 MMHG | SYSTOLIC BLOOD PRESSURE: 157 MMHG

## 2020-03-11 VITALS — DIASTOLIC BLOOD PRESSURE: 74 MMHG | SYSTOLIC BLOOD PRESSURE: 162 MMHG

## 2020-03-11 VITALS — SYSTOLIC BLOOD PRESSURE: 182 MMHG | DIASTOLIC BLOOD PRESSURE: 73 MMHG

## 2020-03-11 VITALS — DIASTOLIC BLOOD PRESSURE: 73 MMHG | SYSTOLIC BLOOD PRESSURE: 126 MMHG

## 2020-03-11 VITALS — DIASTOLIC BLOOD PRESSURE: 56 MMHG | SYSTOLIC BLOOD PRESSURE: 134 MMHG

## 2020-03-11 VITALS — DIASTOLIC BLOOD PRESSURE: 65 MMHG | SYSTOLIC BLOOD PRESSURE: 169 MMHG

## 2020-03-11 VITALS — SYSTOLIC BLOOD PRESSURE: 171 MMHG | DIASTOLIC BLOOD PRESSURE: 84 MMHG

## 2020-03-11 VITALS — SYSTOLIC BLOOD PRESSURE: 210 MMHG | DIASTOLIC BLOOD PRESSURE: 83 MMHG

## 2020-03-11 VITALS — DIASTOLIC BLOOD PRESSURE: 82 MMHG | SYSTOLIC BLOOD PRESSURE: 181 MMHG

## 2020-03-11 VITALS — DIASTOLIC BLOOD PRESSURE: 72 MMHG | SYSTOLIC BLOOD PRESSURE: 154 MMHG

## 2020-03-11 VITALS — DIASTOLIC BLOOD PRESSURE: 62 MMHG | SYSTOLIC BLOOD PRESSURE: 143 MMHG

## 2020-03-11 VITALS — DIASTOLIC BLOOD PRESSURE: 66 MMHG | SYSTOLIC BLOOD PRESSURE: 135 MMHG

## 2020-03-11 VITALS — SYSTOLIC BLOOD PRESSURE: 142 MMHG | DIASTOLIC BLOOD PRESSURE: 66 MMHG

## 2020-03-11 LAB
ANION GAP SERPL CALC-SCNC: 20.2 MMOL/L (ref 8–16)
BASOPHILS NFR BLD AUTO: 0.4 % (ref 0–2)
BUN SERPL-MCNC: 34 MG/DL (ref 7–18)
CALCIUM SERPL-MCNC: 9.5 MG/DL (ref 8.5–10.1)
CHLORIDE SERPL-SCNC: 93 MMOL/L (ref 98–107)
CO2 SERPL-SCNC: 23.6 MMOL/L (ref 21–32)
CREAT SERPL-MCNC: 7.6 MG/DL (ref 0.6–1.3)
EOSINOPHIL NFR BLD: 6.4 % (ref 0–7)
ERYTHROCYTE [DISTWIDTH] IN BLOOD BY AUTOMATED COUNT: 15.7 % (ref 11.5–14.5)
GLUCOSE SERPL-MCNC: 84 MG/DL (ref 74–106)
HCT VFR BLD CALC: 38.5 % (ref 42–54)
HGB BLD-MCNC: 12.9 G/DL (ref 13.5–17.5)
IMM GRANULOCYTES NFR BLD: 0.7 % (ref 0–5)
LYMPHOCYTES NFR BLD AUTO: 14.8 % (ref 15–50)
MCH RBC QN AUTO: 30.1 PG (ref 26–34)
MCHC RBC AUTO-ENTMCNC: 33.5 G/DL (ref 31–37)
MCV RBC: 89.7 FL (ref 80–100)
MONOCYTES NFR BLD: 12.6 % (ref 2–11)
NEUTROPHILS NFR BLD AUTO: 65.1 % (ref 40–80)
OSMOLALITY SERPL CALC.SUM OF ELEC: 272 MOSM/KG (ref 275–300)
PHOSPHATE SERPL-MCNC: 6.2 MG/DL (ref 2.5–4.9)
PLATELET # BLD: 314 10X3/UL (ref 130–400)
PMV BLD AUTO: 10 FL (ref 7.4–10.4)
POTASSIUM SERPL-SCNC: 3.8 MMOL/L (ref 3.5–5.1)
RBC # BLD AUTO: 4.29 10X6/UL (ref 4.2–6.1)
SODIUM SERPL-SCNC: 133 MMOL/L (ref 136–145)
WBC # BLD AUTO: 9.7 10X3/UL (ref 4.8–10.8)

## 2020-03-11 NOTE — NUR
Nutrition follow-up:
Extubated 3/10
NPO until cleared for diet by speech
Labs reviewed
RDN following.

## 2020-03-12 VITALS — SYSTOLIC BLOOD PRESSURE: 181 MMHG | DIASTOLIC BLOOD PRESSURE: 81 MMHG

## 2020-03-12 VITALS — DIASTOLIC BLOOD PRESSURE: 63 MMHG | SYSTOLIC BLOOD PRESSURE: 155 MMHG

## 2020-03-12 VITALS — DIASTOLIC BLOOD PRESSURE: 87 MMHG | SYSTOLIC BLOOD PRESSURE: 173 MMHG

## 2020-03-12 VITALS — SYSTOLIC BLOOD PRESSURE: 178 MMHG | DIASTOLIC BLOOD PRESSURE: 72 MMHG

## 2020-03-12 VITALS — DIASTOLIC BLOOD PRESSURE: 70 MMHG | SYSTOLIC BLOOD PRESSURE: 156 MMHG

## 2020-03-12 VITALS — SYSTOLIC BLOOD PRESSURE: 147 MMHG | DIASTOLIC BLOOD PRESSURE: 81 MMHG

## 2020-03-12 VITALS — SYSTOLIC BLOOD PRESSURE: 193 MMHG | DIASTOLIC BLOOD PRESSURE: 86 MMHG

## 2020-03-12 VITALS — DIASTOLIC BLOOD PRESSURE: 60 MMHG | SYSTOLIC BLOOD PRESSURE: 120 MMHG

## 2020-03-12 VITALS — SYSTOLIC BLOOD PRESSURE: 161 MMHG | DIASTOLIC BLOOD PRESSURE: 72 MMHG

## 2020-03-12 VITALS — SYSTOLIC BLOOD PRESSURE: 184 MMHG | DIASTOLIC BLOOD PRESSURE: 69 MMHG

## 2020-03-12 VITALS — DIASTOLIC BLOOD PRESSURE: 69 MMHG | SYSTOLIC BLOOD PRESSURE: 157 MMHG

## 2020-03-12 VITALS — DIASTOLIC BLOOD PRESSURE: 73 MMHG | SYSTOLIC BLOOD PRESSURE: 176 MMHG

## 2020-03-12 VITALS — SYSTOLIC BLOOD PRESSURE: 130 MMHG | DIASTOLIC BLOOD PRESSURE: 59 MMHG

## 2020-03-12 VITALS — SYSTOLIC BLOOD PRESSURE: 134 MMHG | DIASTOLIC BLOOD PRESSURE: 51 MMHG

## 2020-03-12 VITALS — SYSTOLIC BLOOD PRESSURE: 185 MMHG | DIASTOLIC BLOOD PRESSURE: 83 MMHG

## 2020-03-12 VITALS — DIASTOLIC BLOOD PRESSURE: 77 MMHG | SYSTOLIC BLOOD PRESSURE: 157 MMHG

## 2020-03-12 VITALS — SYSTOLIC BLOOD PRESSURE: 179 MMHG | DIASTOLIC BLOOD PRESSURE: 73 MMHG

## 2020-03-12 VITALS — DIASTOLIC BLOOD PRESSURE: 82 MMHG | SYSTOLIC BLOOD PRESSURE: 164 MMHG

## 2020-03-12 VITALS — DIASTOLIC BLOOD PRESSURE: 62 MMHG | SYSTOLIC BLOOD PRESSURE: 143 MMHG

## 2020-03-12 VITALS — DIASTOLIC BLOOD PRESSURE: 71 MMHG | SYSTOLIC BLOOD PRESSURE: 165 MMHG

## 2020-03-12 VITALS — DIASTOLIC BLOOD PRESSURE: 69 MMHG | SYSTOLIC BLOOD PRESSURE: 165 MMHG

## 2020-03-12 VITALS — SYSTOLIC BLOOD PRESSURE: 177 MMHG | DIASTOLIC BLOOD PRESSURE: 72 MMHG

## 2020-03-12 VITALS — SYSTOLIC BLOOD PRESSURE: 161 MMHG | DIASTOLIC BLOOD PRESSURE: 68 MMHG

## 2020-03-12 VITALS — SYSTOLIC BLOOD PRESSURE: 172 MMHG | DIASTOLIC BLOOD PRESSURE: 80 MMHG

## 2020-03-12 LAB
ANION GAP SERPL CALC-SCNC: 22.9 MMOL/L (ref 8–16)
APTT BLD: 38.9 SECONDS (ref 22.8–39.4)
BASOPHILS NFR BLD AUTO: 0.4 % (ref 0–2)
BUN SERPL-MCNC: 46 MG/DL (ref 7–18)
CALCIUM SERPL-MCNC: 9.6 MG/DL (ref 8.5–10.1)
CHLORIDE SERPL-SCNC: 93 MMOL/L (ref 98–107)
CO2 SERPL-SCNC: 22 MMOL/L (ref 21–32)
CREAT SERPL-MCNC: 9.3 MG/DL (ref 0.6–1.3)
EOSINOPHIL NFR BLD: 5 % (ref 0–7)
ERYTHROCYTE [DISTWIDTH] IN BLOOD BY AUTOMATED COUNT: 15.7 % (ref 11.5–14.5)
ERYTHROCYTE [DISTWIDTH] IN BLOOD BY AUTOMATED COUNT: 15.9 % (ref 11.5–14.5)
FUNGUS MYCOLOGY CULTURE: (no result)
GLUCOSE SERPL-MCNC: 82 MG/DL (ref 74–106)
HCT VFR BLD CALC: 37.6 % (ref 42–54)
HCT VFR BLD CALC: 38.5 % (ref 42–54)
HGB BLD-MCNC: 12.4 G/DL (ref 13.5–17.5)
HGB BLD-MCNC: 13.1 G/DL (ref 13.5–17.5)
IMM GRANULOCYTES NFR BLD: 0.8 % (ref 0–5)
INR PPP: 1.03 (ref 0.85–1.17)
LYMPHOCYTES NFR BLD AUTO: 15.6 % (ref 15–50)
MCH RBC QN AUTO: 29.8 PG (ref 26–34)
MCH RBC QN AUTO: 30.5 PG (ref 26–34)
MCHC RBC AUTO-ENTMCNC: 33 G/DL (ref 31–37)
MCHC RBC AUTO-ENTMCNC: 34 G/DL (ref 31–37)
MCV RBC: 89.5 FL (ref 80–100)
MCV RBC: 90.4 FL (ref 80–100)
MONOCYTES NFR BLD: 10.1 % (ref 2–11)
NEUTROPHILS NFR BLD AUTO: 68.1 % (ref 40–80)
OSMOLALITY SERPL CALC.SUM OF ELEC: 278 MOSM/KG (ref 275–300)
PHOSPHATE SERPL-MCNC: 8 MG/DL (ref 2.5–4.9)
PLATELET # BLD: 278 10X3/UL (ref 130–400)
PLATELET # BLD: 323 10X3/UL (ref 130–400)
PMV BLD AUTO: 9.1 FL (ref 7.4–10.4)
PMV BLD AUTO: 9.9 FL (ref 7.4–10.4)
POTASSIUM SERPL-SCNC: 3.9 MMOL/L (ref 3.5–5.1)
PROTHROMBIN TIME: 13.4 SECONDS (ref 11.6–15)
RBC # BLD AUTO: 4.16 10X6/UL (ref 4.2–6.1)
RBC # BLD AUTO: 4.3 10X6/UL (ref 4.2–6.1)
SODIUM SERPL-SCNC: 134 MMOL/L (ref 136–145)
WBC # BLD AUTO: 10.1 10X3/UL (ref 4.8–10.8)
WBC # BLD AUTO: 11.5 10X3/UL (ref 4.8–10.8)

## 2020-03-12 NOTE — NUR
SPOKE WITH DR BERUMEN (ON CALL FOR DR BELTRAN). SPOKE WITH HIM ABOUT THE BRONCH
WASH CULTURES AND NO DVT TREATMENT FOR DVT IN ARM. DR BERUMEN STATED TO USE
HEPARIN GTT PER PROTOCHOL. HE RECOMENEDED MERRUM BUT HE WANTS PHARMACY TO
RECOMENED A DOSE AND HE WANTS TO SEE IF DR DOW WANTS TO DO A ABT UPDRAFT
TREATMENT. PHARMACY RECOMENDED MERRUM 500MG Q12H X10 DAYS. DR DOW ASKED
ABOUT ABT UPDRAFTS AND HE STATED THEY WHERE NOT NEEDED.

## 2020-03-13 VITALS — SYSTOLIC BLOOD PRESSURE: 171 MMHG | DIASTOLIC BLOOD PRESSURE: 76 MMHG

## 2020-03-13 VITALS — DIASTOLIC BLOOD PRESSURE: 53 MMHG | SYSTOLIC BLOOD PRESSURE: 162 MMHG

## 2020-03-13 VITALS — SYSTOLIC BLOOD PRESSURE: 168 MMHG | DIASTOLIC BLOOD PRESSURE: 75 MMHG

## 2020-03-13 VITALS — SYSTOLIC BLOOD PRESSURE: 148 MMHG | DIASTOLIC BLOOD PRESSURE: 61 MMHG

## 2020-03-13 VITALS — SYSTOLIC BLOOD PRESSURE: 141 MMHG | DIASTOLIC BLOOD PRESSURE: 82 MMHG

## 2020-03-13 VITALS — SYSTOLIC BLOOD PRESSURE: 163 MMHG | DIASTOLIC BLOOD PRESSURE: 76 MMHG

## 2020-03-13 VITALS — DIASTOLIC BLOOD PRESSURE: 98 MMHG | SYSTOLIC BLOOD PRESSURE: 178 MMHG

## 2020-03-13 VITALS — DIASTOLIC BLOOD PRESSURE: 64 MMHG | SYSTOLIC BLOOD PRESSURE: 152 MMHG

## 2020-03-13 VITALS — SYSTOLIC BLOOD PRESSURE: 172 MMHG | DIASTOLIC BLOOD PRESSURE: 60 MMHG

## 2020-03-13 VITALS — DIASTOLIC BLOOD PRESSURE: 77 MMHG | SYSTOLIC BLOOD PRESSURE: 171 MMHG

## 2020-03-13 VITALS — SYSTOLIC BLOOD PRESSURE: 173 MMHG | DIASTOLIC BLOOD PRESSURE: 91 MMHG

## 2020-03-13 VITALS — DIASTOLIC BLOOD PRESSURE: 78 MMHG | SYSTOLIC BLOOD PRESSURE: 151 MMHG

## 2020-03-13 VITALS — SYSTOLIC BLOOD PRESSURE: 150 MMHG | DIASTOLIC BLOOD PRESSURE: 63 MMHG

## 2020-03-13 VITALS — SYSTOLIC BLOOD PRESSURE: 161 MMHG | DIASTOLIC BLOOD PRESSURE: 68 MMHG

## 2020-03-13 VITALS — DIASTOLIC BLOOD PRESSURE: 56 MMHG | SYSTOLIC BLOOD PRESSURE: 92 MMHG

## 2020-03-13 VITALS — SYSTOLIC BLOOD PRESSURE: 112 MMHG | DIASTOLIC BLOOD PRESSURE: 55 MMHG

## 2020-03-13 VITALS — DIASTOLIC BLOOD PRESSURE: 61 MMHG | SYSTOLIC BLOOD PRESSURE: 134 MMHG

## 2020-03-13 VITALS — DIASTOLIC BLOOD PRESSURE: 67 MMHG | SYSTOLIC BLOOD PRESSURE: 137 MMHG

## 2020-03-13 VITALS — DIASTOLIC BLOOD PRESSURE: 62 MMHG | SYSTOLIC BLOOD PRESSURE: 161 MMHG

## 2020-03-13 VITALS — DIASTOLIC BLOOD PRESSURE: 60 MMHG | SYSTOLIC BLOOD PRESSURE: 142 MMHG

## 2020-03-13 VITALS — SYSTOLIC BLOOD PRESSURE: 115 MMHG | DIASTOLIC BLOOD PRESSURE: 76 MMHG

## 2020-03-13 VITALS — DIASTOLIC BLOOD PRESSURE: 74 MMHG | SYSTOLIC BLOOD PRESSURE: 158 MMHG

## 2020-03-13 VITALS — SYSTOLIC BLOOD PRESSURE: 160 MMHG | DIASTOLIC BLOOD PRESSURE: 57 MMHG

## 2020-03-13 VITALS — DIASTOLIC BLOOD PRESSURE: 47 MMHG | SYSTOLIC BLOOD PRESSURE: 162 MMHG

## 2020-03-13 LAB
ANION GAP SERPL CALC-SCNC: 20 MMOL/L (ref 8–16)
BASOPHILS NFR BLD AUTO: 0.4 % (ref 0–2)
BUN SERPL-MCNC: 34 MG/DL (ref 7–18)
CALCIUM SERPL-MCNC: 10.1 MG/DL (ref 8.5–10.1)
CHLORIDE SERPL-SCNC: 95 MMOL/L (ref 98–107)
CO2 SERPL-SCNC: 24.7 MMOL/L (ref 21–32)
CREAT SERPL-MCNC: 7.2 MG/DL (ref 0.6–1.3)
EOSINOPHIL NFR BLD: 2.8 % (ref 0–7)
ERYTHROCYTE [DISTWIDTH] IN BLOOD BY AUTOMATED COUNT: 15.6 % (ref 11.5–14.5)
GLUCOSE SERPL-MCNC: 90 MG/DL (ref 74–106)
HCT VFR BLD CALC: 37.3 % (ref 42–54)
HGB BLD-MCNC: 12.5 G/DL (ref 13.5–17.5)
IMM GRANULOCYTES NFR BLD: 0.5 % (ref 0–5)
LYMPHOCYTES NFR BLD AUTO: 12.5 % (ref 15–50)
MCH RBC QN AUTO: 30.2 PG (ref 26–34)
MCHC RBC AUTO-ENTMCNC: 33.5 G/DL (ref 31–37)
MCV RBC: 90.1 FL (ref 80–100)
MONOCYTES NFR BLD: 12.6 % (ref 2–11)
NEUTROPHILS NFR BLD AUTO: 71.2 % (ref 40–80)
OSMOLALITY SERPL CALC.SUM OF ELEC: 279 MOSM/KG (ref 275–300)
PHOSPHATE SERPL-MCNC: 6.5 MG/DL (ref 2.5–4.9)
PLATELET # BLD: 328 10X3/UL (ref 130–400)
PMV BLD AUTO: 10.4 FL (ref 7.4–10.4)
POTASSIUM SERPL-SCNC: 3.7 MMOL/L (ref 3.5–5.1)
RBC # BLD AUTO: 4.14 10X6/UL (ref 4.2–6.1)
SODIUM SERPL-SCNC: 136 MMOL/L (ref 136–145)
WBC # BLD AUTO: 12.3 10X3/UL (ref 4.8–10.8)

## 2020-03-13 NOTE — NUR
REPOSITIONED PER REQUEST, WIFE TRIED TO REPOSITION PT BY HERSELF, DISCONNECTED
PT FROM MONITOR. REATTACHED TO MONITOR. NO ACUTE NEEDS OR DISTRESS, VSS. WILL
CONT TO MONITOR.

## 2020-03-13 NOTE — NUR
PT IS RESTING IN BED WITH EYES CLOSED. BIPAP IS ON. PERFORMED ORAL CARE AND
PLACED MASK BACK ON. VSS. ALSO TURNED PT AT THIS TIME AND WILL PERFROM
RE-ASSESSMENT AND DOCUMENT. NO NEEDS OR COMPLAINTS VOICED. BED IS LOW,SIDE
RAISLX2,CALL LIGHT WITHIN REACH. WILL CONINTUE TO MONITOR

## 2020-03-13 NOTE — NUR
COMPLETE LINEN CHANGE, REPOSITIONED IN BED. NO ACUTE NEEDS OR DISTRESS NOTED
AT THIS TIME. VSS. WILL CONT TO MONITOR.

## 2020-03-13 NOTE — NUR
REASSESSMENT COMPLETED PER FLOWSHEET, SEE FLOWSHEET FOR INFORMATION. TURNED PT
DOWN TO 1L NC, PT SATING 99% ON 2L NC. WILL CONT TO MONITOR.

## 2020-03-13 NOTE — NUR
PT IS RESTING IN BED WITH EYES CLOSED. BIPAP IS ON. VSS. WHEN ASKED PT VOICES
NO NEEDS OR CONCERNS. BED IS LOW,SIDE RAILSX2,CALL LIGHT WITHIN REAHC. WIFE IS
AT BEDSIDE.

## 2020-03-13 NOTE — NUR
FAMILY AT BEDSIDE, FAMILY REQUESTED PT BE "CLEANED UP RIGHT NOW, HE IS
FILTHY." REASSURED FAMILY THAT NURSE TURNED HIM AND LOOKED AT HIM, BUT NURSE
WOULD BE IN THERE TO GIVE HIM A BATH. CHG BEDBATH AND COMPLETE LINEN CHANGE
COMPLETED. NO BM OR INCONTINENCE NOTED. WILL CONT TO MONITOR.

## 2020-03-13 NOTE — NUR
RECIVED REPORT AT BEDSIDE. PT IS RESTING IN BED WITH EYES CLOSED. BIPAP IS ON.
VSS. PT VOICES NO NEEDS WHEN ASKED. VOICES "NO PAIN" WHEN ASKED. OBSERVED
ISOLATION. WILL PERFORM FULL ASSESSMENT AND DOCUMENT. BED IS LOW,SIDE
RAISLX2,CALL LIGHT WITHIN REACH. WILL CONTINUE TO MONITOR. BED ALARM IS ON

## 2020-03-13 NOTE — NUR
Nutrition follow-up:
Diet advanced to renal puree with honey thick liquids per speech
Labs reviewed
Wt: 249#
RDN following.

## 2020-03-13 NOTE — NUR
BEDSIDE REPORT RECEIVED. SHIFT ASSESSMENT COMPLETED PER FLOWSHEET, SEE
FLOWSHEET FOR INFORMATION. NO ACUTE NEEDS OR DISTRESS NOTED AT THIS TIME. VSS.
WILL CONT TO MONITOR.

## 2020-03-14 VITALS — DIASTOLIC BLOOD PRESSURE: 74 MMHG | SYSTOLIC BLOOD PRESSURE: 166 MMHG

## 2020-03-14 VITALS — DIASTOLIC BLOOD PRESSURE: 48 MMHG | SYSTOLIC BLOOD PRESSURE: 102 MMHG

## 2020-03-14 VITALS — SYSTOLIC BLOOD PRESSURE: 170 MMHG | DIASTOLIC BLOOD PRESSURE: 74 MMHG

## 2020-03-14 VITALS — SYSTOLIC BLOOD PRESSURE: 153 MMHG | DIASTOLIC BLOOD PRESSURE: 69 MMHG

## 2020-03-14 VITALS — DIASTOLIC BLOOD PRESSURE: 69 MMHG | SYSTOLIC BLOOD PRESSURE: 153 MMHG

## 2020-03-14 VITALS — SYSTOLIC BLOOD PRESSURE: 157 MMHG | DIASTOLIC BLOOD PRESSURE: 72 MMHG

## 2020-03-14 VITALS — DIASTOLIC BLOOD PRESSURE: 60 MMHG | SYSTOLIC BLOOD PRESSURE: 158 MMHG

## 2020-03-14 VITALS — DIASTOLIC BLOOD PRESSURE: 74 MMHG | SYSTOLIC BLOOD PRESSURE: 145 MMHG

## 2020-03-14 VITALS — DIASTOLIC BLOOD PRESSURE: 69 MMHG | SYSTOLIC BLOOD PRESSURE: 159 MMHG

## 2020-03-14 VITALS — SYSTOLIC BLOOD PRESSURE: 181 MMHG | DIASTOLIC BLOOD PRESSURE: 75 MMHG

## 2020-03-14 LAB
ANION GAP SERPL CALC-SCNC: 19 MMOL/L (ref 8–16)
BASOPHILS NFR BLD AUTO: 0.4 % (ref 0–2)
BUN SERPL-MCNC: 45 MG/DL (ref 7–18)
CALCIUM SERPL-MCNC: 9.7 MG/DL (ref 8.5–10.1)
CHLORIDE SERPL-SCNC: 96 MMOL/L (ref 98–107)
CO2 SERPL-SCNC: 26.9 MMOL/L (ref 21–32)
CREAT SERPL-MCNC: 8.5 MG/DL (ref 0.6–1.3)
EOSINOPHIL NFR BLD: 2.5 % (ref 0–7)
ERYTHROCYTE [DISTWIDTH] IN BLOOD BY AUTOMATED COUNT: 15.6 % (ref 11.5–14.5)
GLUCOSE SERPL-MCNC: 106 MG/DL (ref 74–106)
HCT VFR BLD CALC: 37.4 % (ref 42–54)
HGB BLD-MCNC: 12.4 G/DL (ref 13.5–17.5)
IMM GRANULOCYTES NFR BLD: 0.6 % (ref 0–5)
LYMPHOCYTES NFR BLD AUTO: 11.7 % (ref 15–50)
MCH RBC QN AUTO: 30.3 PG (ref 26–34)
MCHC RBC AUTO-ENTMCNC: 33.2 G/DL (ref 31–37)
MCV RBC: 91.4 FL (ref 80–100)
MONOCYTES NFR BLD: 16.7 % (ref 2–11)
NEUTROPHILS NFR BLD AUTO: 68.1 % (ref 40–80)
OSMOLALITY SERPL CALC.SUM OF ELEC: 287 MOSM/KG (ref 275–300)
PHOSPHATE SERPL-MCNC: 7.5 MG/DL (ref 2.5–4.9)
PLATELET # BLD: 317 10X3/UL (ref 130–400)
PMV BLD AUTO: 10.2 FL (ref 7.4–10.4)
POTASSIUM SERPL-SCNC: 3.9 MMOL/L (ref 3.5–5.1)
RBC # BLD AUTO: 4.09 10X6/UL (ref 4.2–6.1)
SODIUM SERPL-SCNC: 138 MMOL/L (ref 136–145)
WBC # BLD AUTO: 11.3 10X3/UL (ref 4.8–10.8)

## 2020-03-14 NOTE — NUR
RECEIVED PATIENT FROM ICU VIA BED AT 1510. PATIENT ALERT/AWAKE. RESP EVEN AND
UNLABORED. YANKER PROVIDED, CONNECTED TO SUCTION. RESP EVEN AND UNLABORED.
CALL LIGHT PLACED WITHIN REACH. NO DISTRESS. FISTULA TO RIGHT ARM WITH GOOD
BRUIT AND THRILL. SR UP FOR SAFETY.

## 2020-03-14 NOTE — NUR
PT USED CALL LIGHT FROM ROOM ASKED IF HE COULD BE HELPED TO ROLL OVER.
ASSISTED PT UP IN BED X2 ASSIST AND ROLLED OVER TO LEFT SIDE. PILLOW IS UNDER
BACK AND PT WAS PUT ON HIGH FLOW NC AT 5L WITH A O2 SATURATION %. NO
OTHER NEEDS VOICED. I DID DO ORAL CARE WHILE IN ROOM AND PT IS COUGHING UP
THICK YELLOW SPUTUM. NSS. BED IS LOW,SIDE RAISLX2,CALL LIGHT WIHTIN REACH.
WILL CONTINUE TO MONITOR

## 2020-03-14 NOTE — NUR
PT IS RESTING IN BED WITH EYES CLOSED. BIPAP IS ON. VSS. BED IS LOW,SIDE
RAILSX2,CALL ALLEGRA SANTIZO. WILL CONTIOUE TO MONITOR

## 2020-03-14 NOTE — NUR
RESTING IN BED. ASSISTED TO PULL PATIENT UP IN THE BED AND CURRENTLY SITTING
UP AT 45 DEGREES TO CONSUME PM MEAL. FEMALE VISITOR AT BEDSIDE ASSISTING
PATIENT TO EAT DINNER. RESP EVEN AND UNLABORED. PATIENT CONTINUES TO COUGH UP
THICK SPUTUM, YAWKER MADE AVAILABLE TO PATIENT.

## 2020-03-14 NOTE — NUR
PT HAD INCONTINENT BM IN BED. SMALL SOFT LIGHT BROWN. HAD TO GET ASSIST FROM
ANOTHER NURSE BECAUSE PT IS VERY WEAK AND COULD NOT HELP TO ROLL IN ORDER TO
CLEAN UP. ASSISTED WITH CEANING BM AND ALSO CLEANED GROIN AREA WHICH IS
SLIGHTLY RED IN COLOR WITH BUTT PASTE APPLIED TO HELP FROM SHEERING. PT
TOLERATED WELL. VSS. CHANGED PARTIAL LINENS AND GOWN. BED IS LOW,SIDE
RIALX2,CALL LIGHT WITHIN REACH. WILL CONINTUE TO MONITOR

## 2020-03-14 NOTE — NUR
WENT TO PERFORM RE-ASSESSMENT AT THIS TIME. PT IS AWAKE AND ASK IF HE CAN HAVE
A DRINK. ASSITED TO TAKE BIPAP OFF AND GAVE DRINK OF THICKENED LEMON FLAVORED
WATER AS ORDERED. PT VOICED"THATS BETTER". I ALSO PERFOMRED ORAL HYGIENE TO
HELP WET MOUTH. VITAL SIGNS ARE STABLE. ASSISTED UP IN BED AND TURNED TO RIGHT
SIDE LEAVING BUTTOCK AREA FREE OF PRESSURE. PT VOICED"I WANT TO SIT UP MORE IN
THE BED". SO ASSISTED HIM. NO OTHER NEEDS WERE VOICED. WILL DOCUMENT
RE-ASSESSMENT. BED IS LOW,SIDE RAILSX2,CALL LIGHT WIHTINR EACH. BED ALARM IS
ON. WILL CONINTUE TO MONITOR

## 2020-03-14 NOTE — NUR
REPORT CALLED TO SIRENA ON MED 2. NO ACUTE NEEDS OR DISTRESS NOTED AT THIS
TIME. VSS. WILL CONT TO MONITOR.

## 2020-03-14 NOTE — NUR
BEDSIDE REPORT RECEIVED. SHIFT ASSESSMENT COMPLETED PER FLOWSHEET, SEE
FLOWSHEET FOR INFORMATION. NO ACUTE NEEDS OR DISTRESS NOTED AT THIS TIME. WILL
CONT TO MONITOR.

## 2020-03-14 NOTE — NUR
JUST RECIIVED APPT LAB BACK AND IS 92.0. PER PROTOCOL I AM HOLDING HEPARIN
DRIP FOR 30 MINUTES AND THEN WILL DECREASE  UNITS/HR AND RE CHECK IN 6
HOURS.

## 2020-03-15 VITALS — SYSTOLIC BLOOD PRESSURE: 131 MMHG | DIASTOLIC BLOOD PRESSURE: 74 MMHG

## 2020-03-15 VITALS — DIASTOLIC BLOOD PRESSURE: 64 MMHG | SYSTOLIC BLOOD PRESSURE: 146 MMHG

## 2020-03-15 VITALS — SYSTOLIC BLOOD PRESSURE: 118 MMHG | DIASTOLIC BLOOD PRESSURE: 49 MMHG

## 2020-03-15 VITALS — SYSTOLIC BLOOD PRESSURE: 149 MMHG | DIASTOLIC BLOOD PRESSURE: 65 MMHG

## 2020-03-15 VITALS — DIASTOLIC BLOOD PRESSURE: 42 MMHG | SYSTOLIC BLOOD PRESSURE: 142 MMHG

## 2020-03-15 LAB
ERYTHROCYTE [DISTWIDTH] IN BLOOD BY AUTOMATED COUNT: 15.6 % (ref 11.5–14.5)
HCT VFR BLD CALC: 37 % (ref 42–54)
HGB BLD-MCNC: 11.9 G/DL (ref 13.5–17.5)
MCH RBC QN AUTO: 29.7 PG (ref 26–34)
MCHC RBC AUTO-ENTMCNC: 32.2 G/DL (ref 31–37)
MCV RBC: 92.3 FL (ref 80–100)
PLATELET # BLD: 297 10X3/UL (ref 130–400)
PMV BLD AUTO: 10 FL (ref 7.4–10.4)
RBC # BLD AUTO: 4.01 10X6/UL (ref 4.2–6.1)
WBC # BLD AUTO: 10.7 10X3/UL (ref 4.8–10.8)

## 2020-03-15 NOTE — NUR
RECEIVED REPORT. ASSUMED CARE OF PATIENT. PATIENT RESTING WITH EYES CLOSED,
EASILY AROUSED. BIPAP PATENT. RESP RATE 39. PATIENT REMAINS IN DROPLET
ISOLATION. NO DISTRESS.

## 2020-03-16 VITALS — SYSTOLIC BLOOD PRESSURE: 105 MMHG | DIASTOLIC BLOOD PRESSURE: 73 MMHG

## 2020-03-16 VITALS — SYSTOLIC BLOOD PRESSURE: 148 MMHG | DIASTOLIC BLOOD PRESSURE: 69 MMHG

## 2020-03-16 VITALS — DIASTOLIC BLOOD PRESSURE: 74 MMHG | SYSTOLIC BLOOD PRESSURE: 102 MMHG

## 2020-03-16 VITALS — DIASTOLIC BLOOD PRESSURE: 67 MMHG | SYSTOLIC BLOOD PRESSURE: 134 MMHG

## 2020-03-16 LAB
ALBUMIN SERPL-MCNC: 2 G/DL (ref 3.4–5)
ALP SERPL-CCNC: 84 U/L (ref 30–120)
ALT SERPL-CCNC: 13 U/L (ref 10–68)
ANION GAP SERPL CALC-SCNC: 14.8 MMOL/L (ref 8–16)
BILIRUB SERPL-MCNC: 0.51 MG/DL (ref 0.2–1.3)
BUN SERPL-MCNC: 47 MG/DL (ref 7–18)
CALCIUM SERPL-MCNC: 9.1 MG/DL (ref 8.5–10.1)
CHLORIDE SERPL-SCNC: 98 MMOL/L (ref 98–107)
CK SERPL-CCNC: 46 UL (ref 21–232)
CO2 SERPL-SCNC: 30.2 MMOL/L (ref 21–32)
CREAT SERPL-MCNC: 8.8 MG/DL (ref 0.6–1.3)
CRP SERPL-MCNC: 15.4 MG/DL (ref 0–0.9)
ERYTHROCYTE [DISTWIDTH] IN BLOOD BY AUTOMATED COUNT: 15.7 % (ref 11.5–14.5)
GLOBULIN SER-MCNC: 5.7 G/L
GLUCOSE SERPL-MCNC: 89 MG/DL (ref 74–106)
HCT VFR BLD CALC: 34.2 % (ref 42–54)
HGB BLD-MCNC: 10.9 G/DL (ref 13.5–17.5)
INR PPP: 1.21 (ref 0.85–1.17)
MAGNESIUM SERPL-MCNC: 2.5 MG/DL (ref 1.8–2.4)
MCH RBC QN AUTO: 29.7 PG (ref 26–34)
MCHC RBC AUTO-ENTMCNC: 31.9 G/DL (ref 31–37)
MCV RBC: 93.2 FL (ref 80–100)
OSMOLALITY SERPL CALC.SUM OF ELEC: 288 MOSM/KG (ref 275–300)
PHOSPHATE SERPL-MCNC: 7.8 MG/DL (ref 2.5–4.9)
PLATELET # BLD: 282 10X3/UL (ref 130–400)
PMV BLD AUTO: 11.3 FL (ref 7.4–10.4)
POTASSIUM SERPL-SCNC: 4 MMOL/L (ref 3.5–5.1)
PROT SERPL-MCNC: 7.7 G/DL (ref 6.4–8.2)
PROTHROMBIN TIME: 15.2 SECONDS (ref 11.6–15)
RBC # BLD AUTO: 3.67 10X6/UL (ref 4.2–6.1)
SODIUM SERPL-SCNC: 139 MMOL/L (ref 136–145)
WBC # BLD AUTO: 7.9 10X3/UL (ref 4.8–10.8)

## 2020-03-16 NOTE — NUR
PT RESTING IN BED ALERT AND ORIENTEDX4.  PT WIFE GUMARO AT BEDSIDE. PT ON 4L
HIGH FLOW 4L. NO S/S OF DISTRESS. BP-94/43 OTHERWISE STABLE. BED LOW CALL
LIGHT WITHIN REACH. WILL CONTINUE TO MONITR.

## 2020-03-16 NOTE — NUR
PT VOICES NO C/O OR CONCERNS DURING THE NIGHT. RESTING QUIETLY WITH BIPAP ON.
WIFE AT BEDSIDE. WILL CTM.

## 2020-03-16 NOTE — NUR
REPORT RECEIVED, WILL CONTINUE POC. PATIENT IS AAOX4, LYING IN SEMI-FOWLERS
POSITION. NO S/S OF DISTRESS, RR EVEN AND UNLABORED ON 3.5L HFNC. ASSISTED
PATIENT TO NEW BED THAT WEIGHS AND LOCKS PROPERLY. PERFORMED RAYSHAWN CARE AND
APPLIED BUTT PASTE TO BUTTOCKS. PATIENT DENIES NEEDS AT THIS TIME. CL IN
REACH, BED LOCKED AND LOWERED. WIFE AT BEDSIDE, DROPLET PRECAUTIONS
MAINTAINED. WILL CTM.

## 2020-03-17 VITALS — DIASTOLIC BLOOD PRESSURE: 66 MMHG | SYSTOLIC BLOOD PRESSURE: 97 MMHG

## 2020-03-17 VITALS — SYSTOLIC BLOOD PRESSURE: 103 MMHG | DIASTOLIC BLOOD PRESSURE: 58 MMHG

## 2020-03-17 VITALS — DIASTOLIC BLOOD PRESSURE: 110 MMHG | SYSTOLIC BLOOD PRESSURE: 174 MMHG

## 2020-03-17 VITALS — DIASTOLIC BLOOD PRESSURE: 97 MMHG | SYSTOLIC BLOOD PRESSURE: 124 MMHG

## 2020-03-17 VITALS — SYSTOLIC BLOOD PRESSURE: 157 MMHG | DIASTOLIC BLOOD PRESSURE: 110 MMHG

## 2020-03-17 VITALS — SYSTOLIC BLOOD PRESSURE: 132 MMHG | DIASTOLIC BLOOD PRESSURE: 103 MMHG

## 2020-03-17 LAB
ANION GAP SERPL CALC-SCNC: 16.8 MMOL/L (ref 8–16)
ANISOCYTOSIS BLD QL SMEAR: (no result)
BUN SERPL-MCNC: 58 MG/DL (ref 7–18)
CALCIUM SERPL-MCNC: 8.9 MG/DL (ref 8.5–10.1)
CHLORIDE SERPL-SCNC: 97 MMOL/L (ref 98–107)
CO2 SERPL-SCNC: 27.4 MMOL/L (ref 21–32)
CREAT SERPL-MCNC: 10.1 MG/DL (ref 0.6–1.3)
EOSINOPHIL NFR BLD: 1 % (ref 0–7)
ERYTHROCYTE [DISTWIDTH] IN BLOOD BY AUTOMATED COUNT: 15.5 % (ref 11.5–14.5)
GLUCOSE SERPL-MCNC: 80 MG/DL (ref 74–106)
HCT VFR BLD CALC: 34.1 % (ref 42–54)
HGB BLD-MCNC: 10.6 G/DL (ref 13.5–17.5)
LYMPHOCYTES NFR BLD AUTO: 9 % (ref 15–50)
MCH RBC QN AUTO: 29.2 PG (ref 26–34)
MCHC RBC AUTO-ENTMCNC: 31.1 G/DL (ref 31–37)
MCV RBC: 93.9 FL (ref 80–100)
MONOCYTES NFR BLD: 21 % (ref 2–11)
NEUTROPHILS NFR BLD AUTO: 69 % (ref 40–80)
OSMOLALITY SERPL CALC.SUM OF ELEC: 288 MOSM/KG (ref 275–300)
PHOSPHATE SERPL-MCNC: 7.5 MG/DL (ref 2.5–4.9)
PLATELET # BLD EST: NORMAL 10*3/UL
PLATELET # BLD: 283 10X3/UL (ref 130–400)
PMV BLD AUTO: 10.9 FL (ref 7.4–10.4)
POTASSIUM SERPL-SCNC: 4.2 MMOL/L (ref 3.5–5.1)
RBC # BLD AUTO: 3.63 10X6/UL (ref 4.2–6.1)
ROULEAUX BLD QL SMEAR: (no result)
SODIUM SERPL-SCNC: 137 MMOL/L (ref 136–145)
WBC # BLD AUTO: 6.6 10X3/UL (ref 4.8–10.8)

## 2020-03-17 NOTE — NUR
Rehab Note- Acute Inpatient Rehab prescreen order received. The patient has
PT, OT, ST in at this time. The patient hasn't been seen since 3/13 with OT,
and refused PT on 3/15 and then did not have therapy on 3/16, have spoken with
Albert yanez. Will have to participate in the required therapy
3hrs/day. Will continue to follow at this time and also the patient's wife
wants him to go to outpatient therapy. Will follow at this time. Thank you for
this referral!
Evelyn Ramon RN
Clinical Liaison, [facility]

## 2020-03-17 NOTE — NUR
REPORT RECEIVED, WILL CONTINUE POC. PATIENT IS AAOX4, DIALYSIS AT BEDSIDE. NO
S/S OF DISTRESS OBSERVED, RR EVEN AND UNLABORED ON 3.5L HFNC. RT IJ SL.
PATIENT DENIES NEEDS AT THIS TIME. CL IN REACH, BED LOCKED AND LOWERED. POSEY
ALARM ON, DROPLET PRECAUTIONS MAINTAINED. WIFE AT BEDSIDE. WILL CTM.

## 2020-03-17 NOTE — NUR
OT NOTE: PT SEEN IN PM; PERFORMED BETTER WITH TRANSFER USING RW; REQUIRED
ASSIST WITH WALKER MGMT DUE TO UE WEAKNESS,  HOWEVER, ABLE TO TAKE APPROX 6-7
STEPS FROM CHAIR TO BED. TRANSFERRED TO BED TO REST; THEN TOOK APPROX 4 MORE
SIDE STEPS WITH MOD ASSIST WITH WALKER MGMT ONLY.  PT CONT TO HAVE DIFFICULTY
WITH FEEDING AND ADLS DUE TO LIMITED  USE OF HANDS AND WEAKNESS IN UES. PT
ABLE TO HOLD CUP OF COFFEE, HOWEVER, WIFE ASSISTS WITH FEEDING. MOD ASSIST FOR
SIT TO SUPINE. EDUCATED PT ON FINGER FLEX/EXT EXS TO IMPROVE ROM AND 
STRENGHT.
ROSCOE JACOBSON, OTR/L
[phone]

## 2020-03-17 NOTE — NUR
INFORMED BY DIALYSIS TECH THAT SHE GOT 3L OFF AND THAT HIS BP DROPPED BUT ITS
BACK UP NOW. PATIENT IS WANTING TO SIT UP ON SIDE OF BED, ADVISED PATIENT THAT
HE SOULD LIE DOWN FOR A BIT CONSIDERING HE HAD RECEIVED BP MEDS JUST BEFORE
DIALYSIS AND A NORCO DURING DIALYSIS THAT WE DON'T WANT HIS BP TO BOTTOM OUT.
PATIENT UNDERSTANDS.

## 2020-03-17 NOTE — NUR
OT NOTE: PT DOING BETTER; ABLE TO PERFORM BED MOB WITH MOD ASSIST; SPV WITH
STATIC SITTING; TRANSFERS WITH MOD/MAX ASSIST; CONT WITH UE WEAKNESS, HOWEVER,
IMPROVEMENT IN FUNCTIONAL USE OF HANDS NOTED. RECOMMEND IP REHAB TO IMPROVE
STRENGTH, ENDURANCE, AND ADLS IN ORDER TO RETURN HOME.
ROSCOE JACOBSON, OTR/L

## 2020-03-17 NOTE — MORECARE
CASE MANAGEMENT DISCHARGE SUMMARY
 
 
PATIENT: BALA SCHULTZ                  UNIT: [id number]
ACCOUNT#: [id number]                       ADM DATE: 20
AGE: 64     : [birth year]  SEX: M            ROOM/BED: D.2111    
AUTHOR: SOHEILA,DOC                             PHYSICIAN:                               
 
REFERRING PHYSICIAN: BRANDON HILTON MD            
DATE OF SERVICE: 20
Discharge Plan
 
 
Patient Name: BALA SCHULTZ
Facility: [facility]:[place]
Encounter #: [id number]
Medical Record #: [id number]
: 1955
Planned Disposition: Home
Anticipated Discharge Date: 
 
Discharge Date: 
Expected LOS: 
Initial Reviewer: [email or web address]
Initial Review Date: 2020
Generated: 3/17/20   9:37 am 
DCP- Discharge Planning
 
Updated by [id number]: Naty Stanley on 20   3:59 pm CT
Patient Name: BALA SCHULTZ                                     
Admission Status: ER   
Accout number: [id number]                              
Admission Date: 2020   
: 1955                                                        
Admission Diagnosis:   
Attending: BRANDON HILTON                                                
Current LOS:  1   
  
Anticipated DC Date:    
Planned Disposition: Home   
Primary Insurance: MEDICARE A & B   
  
  
Discharge Planning Comments: CM met with patient's daughter to complete 
initial dc planning assessment.  CM educated patient on the CM role and 
verbal consent given by patient to complete assessment. Patient is currently 
sedated on vent.  CM verified patient's address, phone number, and emergency 
contact phone numbers.  Patient lives at home with his spouse.  At discharge 
patient plans to return home and feels that she may require some assistance.  
CM discussed availability of home health, rehab services, and medical 
 
equipment. Patient's daughter is uncertain of patient's medical equipment at 
home. Patient does have hemodialysis MWF in [place] @ 0600.  CM will continue 
to follow and will assist as needed with dc plans/needs.    
  
  
  
  
  
  
: Naty Stanley
 DCPIA - Discharge Planning Initial Assessment
 
Updated by [id number]: Naty Stanley on 20   5:26 pm
*  Is the patient Alert and Oriented?
Yes
*  How many steps to enter\exit or inside your home?
 
*  PCP
[facility]
*  [facility]
*  Preadmission Environment
Home with Family
*  ADLs
Independent
*  Equipment
None
*  Other Equipment
UNCERTAIN?
*  List name and contact numbers for known caregivers / representatives who 
currently or will assist patient after discharge:
GUMARO SCHULTZ - SPOUSE - [phone]
*  Verbal permission to speak to the caregivers and representatives has been 
obtained from the patient.
Yes
*  Community resources currently utilized
None
*  Additional services required to return to the preadmission environment?
No
*  Can the patient safely return to the preadmission environment?
Yes
*  Has this patient been hospitalized within the prior 30 days at any 
hospital?
No
 
 
 
 
 
Coverage Notice
 
Reviewer: [id number] Kahlil Stanley
 
 
Notice Issued Date-Time: 2020  17:09
Notice Type: Patient Choice Letter
 
Notice Delivered To: Family Member
Relationship to Patient: Spouse
Representative Name: GUMARO SCHULTZ
 
Delivery Method: HAND - Hand Delivered
Porsche Days:
Prior Verbal Notification: 
 
Recipient Understood Notice: Yes
Recipient Signature: Yes
Med Rec Note Co-signed by Attending:
 
Coverage Notice Comment:  [facility] FOR ANY HOME HEALTH AVAIALBLE AT DISCHARGE
 
Last DP export: 20   4:06 p
Patient Name: BALA SCHULTZ
Encounter #: [id number]
Page [phone]
 
 
 
 
 
Electronically Signed by KEVIN PARNELL on 20 at 0837
 
 
 
 
 
 
**All edits/amendments must be made on the electronic document**
 
DICTATION DATE: 20 0837     : JAG  20 0837     
RPT#: [id number]                                DC DATE:        
                                               STATUS: ADM IN  
[facility]
 [place] [address]
***END OF REPORT***

## 2020-03-17 NOTE — NUR
CONTINUES SITTING UP IN CHAIR. WIFE AT BEDSIDE ASSISTING PATIENT WITH NOON
MEAL. NO DISTRESS. CALL LIGHT WITHIN REACH.

## 2020-03-17 NOTE — NUR
Nutrition Follow-up:
Observed breakfast tray this AM (~<=50% eaten). Noted ST continues to rec
puree with honey thick liquids.
Diet: Renal, Puree with Honey Thick Liquids
PO intake: 25-50%
Wt: 217.1# (3/17); 233.6# (3/14); 251.3# (3/8); 245.8# (3/3); 240.3# (2/27)
Last BM: 3/16 per chart
Labs noted: PO4 7.5
Meds noted: Renagel, Sensipar, Nephrovite, Pepcid
-Encourage PO intake and honor food preferences within diet restrictions.
-+Nepro with meals.
-MD may consider appetite stimulant.
-Monitor wt; noted wt loss.
-RD following.

## 2020-03-17 NOTE — NUR
RECEIVED REPORT. ASSUMED CARE OF PATIENT. RESTING IN BED WITH EYES CLOSED.
RESP EVEN AND UNLABORED. BIPAP OFF, 02 VIA NC. PATIENT WIFE AT BEDSIDE.
PATIENT REMAINS IN DROPLET ISOLATION FOR PSEUDOMONAS. CALL LIGHT WITHIN REACH.
 HERE FOR AM ROUNDS.

## 2020-03-18 ENCOUNTER — HOSPITAL ENCOUNTER (INPATIENT)
Dept: HOSPITAL 84 - D.REHAB | Age: 65
LOS: 13 days | Discharge: HOME HEALTH | DRG: 91 | End: 2020-03-31
Attending: FAMILY MEDICINE | Admitting: FAMILY MEDICINE
Payer: MEDICARE

## 2020-03-18 VITALS — DIASTOLIC BLOOD PRESSURE: 67 MMHG | SYSTOLIC BLOOD PRESSURE: 151 MMHG

## 2020-03-18 VITALS — DIASTOLIC BLOOD PRESSURE: 51 MMHG | SYSTOLIC BLOOD PRESSURE: 97 MMHG

## 2020-03-18 VITALS — DIASTOLIC BLOOD PRESSURE: 50 MMHG | SYSTOLIC BLOOD PRESSURE: 125 MMHG

## 2020-03-18 VITALS — DIASTOLIC BLOOD PRESSURE: 50 MMHG | SYSTOLIC BLOOD PRESSURE: 123 MMHG

## 2020-03-18 VITALS — BODY MASS INDEX: 35.01 KG/M2 | WEIGHT: 231 LBS | HEIGHT: 68 IN | BODY MASS INDEX: 35.01 KG/M2

## 2020-03-18 VITALS — DIASTOLIC BLOOD PRESSURE: 92 MMHG | SYSTOLIC BLOOD PRESSURE: 129 MMHG

## 2020-03-18 DIAGNOSIS — I12.0: ICD-10-CM

## 2020-03-18 DIAGNOSIS — N18.6: ICD-10-CM

## 2020-03-18 DIAGNOSIS — J96.01: ICD-10-CM

## 2020-03-18 DIAGNOSIS — R53.1: ICD-10-CM

## 2020-03-18 DIAGNOSIS — E78.5: ICD-10-CM

## 2020-03-18 DIAGNOSIS — J96.02: ICD-10-CM

## 2020-03-18 DIAGNOSIS — I25.10: ICD-10-CM

## 2020-03-18 DIAGNOSIS — R47.1: ICD-10-CM

## 2020-03-18 DIAGNOSIS — D63.1: ICD-10-CM

## 2020-03-18 DIAGNOSIS — E11.22: ICD-10-CM

## 2020-03-18 DIAGNOSIS — R13.12: ICD-10-CM

## 2020-03-18 DIAGNOSIS — Z99.2: ICD-10-CM

## 2020-03-18 DIAGNOSIS — I27.20: ICD-10-CM

## 2020-03-18 DIAGNOSIS — D69.6: ICD-10-CM

## 2020-03-18 DIAGNOSIS — K21.9: ICD-10-CM

## 2020-03-18 DIAGNOSIS — G72.81: Primary | ICD-10-CM

## 2020-03-18 LAB
ANION GAP SERPL CALC-SCNC: 16.2 MMOL/L (ref 8–16)
BUN SERPL-MCNC: 40 MG/DL (ref 7–18)
CALCIUM SERPL-MCNC: 8.9 MG/DL (ref 8.5–10.1)
CHLORIDE SERPL-SCNC: 98 MMOL/L (ref 98–107)
CO2 SERPL-SCNC: 28 MMOL/L (ref 21–32)
CREAT SERPL-MCNC: 7.9 MG/DL (ref 0.6–1.3)
ERYTHROCYTE [DISTWIDTH] IN BLOOD BY AUTOMATED COUNT: 15.2 % (ref 11.5–14.5)
GLUCOSE SERPL-MCNC: 91 MG/DL (ref 74–106)
HCT VFR BLD CALC: 36.3 % (ref 42–54)
HGB BLD-MCNC: 11.7 G/DL (ref 13.5–17.5)
MCH RBC QN AUTO: 30.2 PG (ref 26–34)
MCHC RBC AUTO-ENTMCNC: 32.2 G/DL (ref 31–37)
MCV RBC: 93.8 FL (ref 80–100)
OSMOLALITY SERPL CALC.SUM OF ELEC: 285 MOSM/KG (ref 275–300)
PHOSPHATE SERPL-MCNC: 6.3 MG/DL (ref 2.5–4.9)
PLATELET # BLD: 241 10X3/UL (ref 130–400)
PMV BLD AUTO: 10.1 FL (ref 7.4–10.4)
POTASSIUM SERPL-SCNC: 4.2 MMOL/L (ref 3.5–5.1)
RBC # BLD AUTO: 3.87 10X6/UL (ref 4.2–6.1)
SODIUM SERPL-SCNC: 138 MMOL/L (ref 136–145)
WBC # BLD AUTO: 6.1 10X3/UL (ref 4.8–10.8)

## 2020-03-18 NOTE — MORECARE
CASE MANAGEMENT DISCHARGE SUMMARY
 
 
PATIENT: BALA SCHULTZ                  UNIT: [id number]
ACCOUNT#: [id number]                       ADM DATE: 20
AGE: 64     : [birth year]  SEX: M            ROOM/BED: D.2111    
AUTHOR: KEVIN PARNELL                             PHYSICIAN:                               
 
REFERRING PHYSICIAN: BRANDON HILTON MD            
DATE OF SERVICE: 20
Discharge Plan
 
 
Patient Name: BALA SCHULTZ
[facility]
Encounter #: [id number]
Medical Record #: [id number]
: 1955
Planned Disposition: Inpatient Rehab
Anticipated Discharge Date: 3/18/20
 
Discharge Date: 
Expected LOS: 21
Initial Reviewer: [id number]
Initial Review Date: 2020
Generated: 3/18/20   4:22 pm 
Comments
 
DCP- Discharge Planning
 
Updated by [email or web address]: Evelio Salinas on 3/18/20   2:19 pm CT
Patient Name:  BALA SCHULTZ   
Encounter No:  [id number]   
:  1955   
Primary Insurance:  MEDICARE A & B  
Anticipated DC Date: 2020   
Planned Disposition:  Inpatient Rehab  
External Planned Provider: [facility] INPATIENT REHAB  
  
  
DCP follow-up note: CM RECEIVED INPATIENT REHAB  PRESCREEN,SPOKE TO PT AND 
SPOUSE IN ROOM REGARDING TREATMENT PLAN AND OPTIONS FOR REHAB.  PT PROVIDED 
PERMISSION TO DISCUSS HIS CARE AND TREATMENT IN PRESENCE OF AND WITH HIS WIFE,
 GUMARO.  CM DISCUSSED REHAB OPTION, LOCATIONS AND PROVIDERS.  PT AND SPOUSE 
AGREE TO REHAB AT [facility].  CHOICE SIGNED.  IMPORTANT MESSAGE FROM 
MEDICARE PROVIDED AND DISCUSSED.    
  
CM SPOKE TO GINGER OF INPATIENT REHAB, THEY PLAN TO ACCEPT PT TODAY IF ABLE 
TO DISCHARGE FOR REHAB. CM NOTIFIED DISHA CAMACHO WHO PROVIDED DISCHARGE 
ORDERS.  BEDSIDE AND  NURSE NOTIFIED.  
 
  
[facility] INPATIENT REHAB TO CONTACT MED 2 NURSE WITH ROOM 
NUMBER WHEN READY TO ACCEPT PT AND NURSE REPORT.  
  
ALLY Hu
DCP- Discharge Planning
 
Updated by [id number]: Naty Stanley on 20   3:59 pm CT
Patient Name: BALA SCHULTZ                                     
Admission Status: ER   
Accout number: [id number]                              
Admission Date: 2020   
: 1955                                                        
Admission Diagnosis:   
Attending: BRANDON HILTON                                                
Current LOS:  1   
  
Anticipated DC Date:    
Planned Disposition: Home   
Primary Insurance: MEDICARE A & B   
  
  
Discharge Planning Comments: CM met with patient's daughter to complete 
initial dc planning assessment.  CM educated patient on the CM role and 
verbal consent given by patient to complete assessment. Patient is currently 
sedated on vent.  CM verified patient's address, phone number, and emergency 
contact phone numbers.  Patient lives at home with his spouse.  At discharge 
patient plans to return home and feels that she may require some assistance.  
CM discussed availability of home health, rehab services, and medical 
equipment. Patient's daughter is uncertain of patient's medical equipment at 
home. Patient does have hemodialysis MWF in [place] @ 0600.  CM will continue 
to follow and will assist as needed with dc plans/needs.    
  
  
  
  
  
  
: Naty Stanley
 DCPIA - Discharge Planning Initial Assessment
 
Updated by [id number]: Naty Stanley on 20   5:26 pm
*  Is the patient Alert and Oriented?
Yes
*  How many steps to enter\exit or inside your home?
 
*  PCP
[facility]
*  Pharmacy
[facility]
*  Preadmission Environment
Home with Family
 
*  ADLs
Independent
*  Equipment
None
*  Other Equipment
UNCERTAIN?
*  List name and contact numbers for known caregivers / representatives who 
currently or will assist patient after discharge:
GUMARO SCHULTZ - SPOUSE - [phone]
*  Verbal permission to speak to the caregivers and representatives has been 
obtained from the patient.
Yes
*  Community resources currently utilized
None
*  Additional services required to return to the preadmission environment?
No
*  Can the patient safely return to the preadmission environment?
Yes
*  Has this patient been hospitalized within the prior 30 days at any 
hospital?
No
 
 
 
 
 
Coverage Notice
 
Reviewer: [email or web address] - Naty Stanley
 
Notice Issued Date-Time: 2020  17:09
Notice Type: Patient Choice Letter
 
Notice Delivered To: Family Member
Relationship to Patient: Spouse
Representative Name: GUMARO SCHULTZ
 
Delivery Method: HAND - Hand Delivered
Porsche Days:
Prior Verbal Notification: 
 
Recipient Understood Notice: Yes
Recipient Signature: Yes
Med Rec Note Co-signed by Attending:
 
Coverage Notice Comment:  [facility] FOR ANY HOME HEALTH AVAIALBLE AT DISCHARGE
Reviewer: [email or web address] - Evelio Salinas
 
Notice Issued Date-Time: 2020  12:50
Notice Type: IM Discharge Notice
 
Notice Delivered To: Family Member
 
Relationship to Patient: Spouse
Representative Name: GUMARO BOURNEY
 
Delivery Method: HAND - Hand Delivered
Porsche Days:
Prior Verbal Notification: 
 
Recipient Understood Notice: Yes
Recipient Signature: Yes
Med Rec Note Co-signed by Attending:
 
Coverage Notice Comment:  
Reviewer: [email or web address] Kahlil Salinas
 
Notice Issued Date-Time: 2020  12:50
Notice Type: Patient Choice Letter
 
Notice Delivered To: Family Member
Relationship to Patient: Spouse
Representative Name: GUMARO SCHULTZ
 
Delivery Method: HAND - Hand Delivered
Porsche Days:
Prior Verbal Notification: 
 
Recipient Understood Notice: Yes
Recipient Signature: Yes
Med Rec Note Co-signed by Attending:
 
Coverage Notice Comment:  [facility] INPT REHAB
 
Last DP export: 3/17/20   7:37 a
Patient Name: BALA SCHULTZ
Encounter #: [id number]
Page [id number]
 
 
 
 
 
Electronically Signed by KEVIN PARNELL on 20 at 1522
 
 
 
 
 
 
**All edits/amendments must be made on the electronic document**
 
DICTATION DATE: 20     : JAG  20 152     
RPT#: [id number]                                DC DATE:        
                                               STATUS: ADM IN  
[facility]
[address] [place] [address]
***END OF REPORT***

## 2020-03-18 NOTE — NUR
RECIEVED FROM ACUTE TO ROOM 1116/WC.PLACED ON 4L/HIGH FLOW NASAL
CANNULA.ASSISTED X2 PERSON TO BED.ORIENTED TO SURROUNDINGS.ACCOMPANIED WITH
WIFE .

## 2020-03-18 NOTE — NUR
PT SITTING UP IN BED EATING DINNER WITH ASSISTANCE FROM WIFE. BELONGINGS BEING
COLLECTED. PT IS ACCEPTED INTO INPATIENT REHAB. WILL CALL REPORT AND TRANSFER
AFTER DINNER.

## 2020-03-18 NOTE — NUR
AM ROUNDS COMPLETED. INTRODUCED MYSELF TO PT AS PRIMARY RN FOR TODAYS SHIFT.
PT IS A&O SITTING UP IN BED RESTING QUIETLY. NO FAMILY PRESENT CURRENTLY.
SHIFT ASSESSMENT COMPLETED. PTS R.NECK CVL DRSG IS SOILED AND FALLING OFF.
STERILE DRSG CHANGE PROVIDED AND BIOPATCH INTACT, DRSG ADHERED TO SKIN AND
SWAB CAPS IN USE. ALL LUMENS OF TRIPLE LUMEN FLUSH WITHOUT ANY RESISTANCE
NOTED. PT DENIES ANY CURRENT PAIN OR NEEDS AT THIS TIME. WILL REVIEW CHART AND
CPOC.

## 2020-03-18 NOTE — NUR
COMPLETE BATH GIVEN ALONG WITH LINEN CHANGE. PT STILL SITTING UP IN CHAIR
RESTING QUIETLY WITH WIFE AT BEDSIDE. PT DENIES ANY CURRENT PAIN OR NEEDS AT
THIS TIME. CL IN REACH. WILL CTM.

## 2020-03-18 NOTE — NUR
OT NOTE: PT COMPLETED SIDE ROLLING WITH MIN A. PT COMPLETED SIT TO STAND WITH
MIN/MOD A. PT COMPLETED BED TO CHAIR TRANSFER WITH MOD A. PT COMPLETED UE
FUNCTIONAL REACHING WITH TASKS AND WALKER MANAGEMENT.
[phone]
THANK YOU,RUSSEL GUAMAN

## 2020-03-18 NOTE — RHP
PATIENT: BALA SCHULTZ                              MEDICAL RECORD: [id number]
ACCOUNT: [id number]                                    LOCATION:[facility]1116
: 57                                            ADMISSION DATE: 20
                                                         
 
                     REHABILITATION HISTORY AND PHYSICAL EXAMINATION
                         POST ADMISSION PHYSICIAN EXAMINATION
 
 
DATE OF ADMISSION:  2020
 
ADMITTING DIAGNOSIS:  Critical illness myopathy.
 
HISTORY OF PRESENT ILLNESS:  The patient admitted to inpatient rehabilitation
for critical illness myopathy.  A 64-year-old gentleman who presented to the ED
after hemodialysis, complaining of cold-like symptoms, shortness of breath.  He
was unable to walk, got a history of CVA, glaucoma, depression, diabetes,
hypertension, arthritis, peripheral vascular disease, end-stage renal dialysis. 
The patient was seen by nephrology and he was totally unresponsive and
unarousable.  He was on BiPAP.  His chest x-ray; however, was not compatible
with pulmonary edema.  His troponin was somewhat elevated.  He was initially
intubated in the ED.  He recently had been stopped on his Lyrica prior to this
and Zanaflex had been started.  His D-dimer was elevated, but he had no signs of
PE.  The patient was taken to the cath lab.  He had a recent PTCA with stent to
the RCA and recent PTCA with left main stenting and also circumflex.  The
patient was admitted to ICU, he was continued on respiratory support until
03/10/2020.  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  He has had
an extended hospital stay.  He has been on hemodialysis, he has been receiving
OT, PT and speech therapy, slowly progressing.  The patient needs to be
monitored closely for his recent cardiac complication with his MI and stent
placement.  He has got a pacemaker.  He has got acute confusion, he is using
supplemental O2.  He has got poor oral intake.  He is on speech therapy
recommendations with just honey thickened liquids.  We are monitoring his lab
values, he is receiving IV therapy.  He is having proximal muscle weakness,
balance deficits, low endurance, decreased activity tolerance, decreased
strength, gait disturbance, limited safety awareness.  He has got inability to
care for himself and self-care deficits.  These are barriers to his discharge
home.  He and his wife live at home.  He is independent with ADLs and mobility
prior to this.  He is using a cane.  He is currently setup for max assist for
ADLs, mod to max assist for mobility and he and his family would like for him to
get back home at his prior level of functioning.
 
COMORBIDITIES:  Include weakness, end-stage renal dialysis.  He has got
oropharyngeal dysphagia.  He has got moderate pulmonary hypertension, has got
coronary artery disease, anemia, diabetes.
 
PAST MEDICAL HISTORY:  Significant for CVA, numbness, vertigo, cataracts, got a
history of stents and angioplasty, got a history of pacemaker placement,
coronary artery disease, end-stage renal disease, acid reflux.
 
PAST SURGICAL HISTORY:  Includes cataract surgery, knee scope.  He has had a
fistula placed.  He has got a pacemaker.
 
ALLERGIES:  GABAPENTIN.
 
CURRENT MEDICATIONS:  Include Merrem, he is on 500 mg IV every 24 hours.  He is
on Floranex 1 cap daily, metoprolol 50 mg daily, folic acid 1 tab daily, Plavix
75 mg daily, Sensipar 30 mg daily, aspirin chewable 81 mg daily.  He is on
 
 
 
HISTORY AND PHYSICAL                           [id number]    BALA SCHULTZ      
 
 
Crestor 10 mg at bedtime.  He is on sevelamer 0.8 grams t.i.d. and tramadol 50
mg every 4 hours p.r.n.
 
HABITS:  No alcohol or tobacco use.
 
FAMILY HISTORY:  Noncontributory.
 
SOCIAL HISTORY:  The patient hopes to return back home and get back to his prior
level of functioning.
 
REVIEW OF SYSTEMS:
GENERAL:  Does complain of weakness and fatigue.
HEENT:  Denies cold, cough, or congestion.
CARDIOVASCULAR:  Denies chest pain.
 
PHYSICAL EXAMINATION:
VITAL SIGNS:  Stable, afebrile.
GENERAL:  A well-developed elderly gentleman, in no acute distress upon exam.
HEENT:  Normocephalic and atraumatic.  Mucosa moist.
NECK:  Supple.  No lymphadenopathy.
LUNGS:  Clear at this time.  No wheezing, rhonchi or rales.
HEART:  Regular rate and rhythm.  No murmurs, rubs, or gallops.
ABDOMEN:  Soft, benign, and nondistended.  Positive bowel sounds times 4.
EXTREMITIES:  No clubbing, cyanosis or edema.  His fistula looks good.
NEUROLOGIC:  He does have proximal muscle weakness.
 
LABORATORY DATA:  White count is 6.2, H&H of 11 and 34.8 and platelet count is
240.  Sodium 137, potassium 4.2, BUN and creatinine of 53 and 9.8, blood sugar
is noted to be 86.
 
ASSESSMENT:  This is a 64-year-old gentleman admitted to the rehab with a
working diagnosis of critical illness myopathy.  The patient has potential to
make improvement.  We instituted the following multidisciplinary therapies
include, but not limited to physical, occupational, respiratory, speech,
nutritional services, prosthetics and orthotics.  Given his complex medical
condition and risks for more complications, rehabilitation services cannot be
provided at a low level of care such as skilled nurse facility.
 
PLAN:
1.  Admit to [facility] for intensive inpatient therapy to include the
following disciplines;
A.  Physical therapy to improve gait, all transfer skills and bed mobility to a
modified independent level.
B.  Occupational therapy to a modified independent level.
C.  Case management to assist with discharge planning and placement options.
D.  Nutrition to assist with nutritional needs.
E.  Rehabilitation nursing to assist in monitoring the patient's underlying
medical conditions and to assist with any type of bowel or bladder management.
2.  The patient's current medication and medical care will be continued.
3.  The patient will be placed on standard fall precautions.
4.  Appreciate renal seeing this patient during his stay and providing
hemodialysis.
5.  We will see again in the a.m.
 
TRANSINT:[id number] Voice Confirmation ID: [id number] DOCUMENT ID: [id number]
 
 
 
HISTORY AND PHYSICAL                           [id number]    BALA SCHULTZ notes whether there has been none or any medical/functional
change since admission:
- No change since preadmission screen.                                  
 
 
KANDY attests patient continues to be appropriate for IRF:
- Continues to be appropriate.                                          
 
 
                                           
                                           MELISSA LEE MD           
 
 
 
 
 
 
 
 
 
 
 
 
 
 
 
 
 
 
 
 
 
 
 
 
 
 
 
 
 
 
 
 
 
 
 
 
CC:                                                             [id number]
DICTATION DATE: 20     :     20 09      ADM IN  
                                                                              
[facility]                                          
[address] [id number]

## 2020-03-18 NOTE — NUR
DISCHARGE PAPERS SIGNED. REPORT CALLED TO RINA MANN. PT DENIES ANY FURTHER
NEEDS. WIFE AT SIDE AND HAS ALL BELONGINGS WILL TRANSFER DOWN AT THIS TIME.

## 2020-03-19 VITALS — DIASTOLIC BLOOD PRESSURE: 53 MMHG | SYSTOLIC BLOOD PRESSURE: 144 MMHG

## 2020-03-19 VITALS — DIASTOLIC BLOOD PRESSURE: 109 MMHG | SYSTOLIC BLOOD PRESSURE: 182 MMHG

## 2020-03-19 LAB
ANION GAP SERPL CALC-SCNC: 14.7 MMOL/L (ref 8–16)
BASOPHILS NFR BLD AUTO: 0.3 % (ref 0–2)
BUN SERPL-MCNC: 53 MG/DL (ref 7–18)
CALCIUM SERPL-MCNC: 8.9 MG/DL (ref 8.5–10.1)
CHLORIDE SERPL-SCNC: 98 MMOL/L (ref 98–107)
CO2 SERPL-SCNC: 28.5 MMOL/L (ref 21–32)
CREAT SERPL-MCNC: 9.8 MG/DL (ref 0.6–1.3)
EOSINOPHIL NFR BLD: 6.2 % (ref 0–7)
ERYTHROCYTE [DISTWIDTH] IN BLOOD BY AUTOMATED COUNT: 15.3 % (ref 11.5–14.5)
GLUCOSE SERPL-MCNC: 86 MG/DL (ref 74–106)
HCT VFR BLD CALC: 34.8 % (ref 42–54)
HGB BLD-MCNC: 11 G/DL (ref 13.5–17.5)
IMM GRANULOCYTES NFR BLD: 0.3 % (ref 0–5)
LYMPHOCYTES NFR BLD AUTO: 23.2 % (ref 15–50)
MCH RBC QN AUTO: 29.7 PG (ref 26–34)
MCHC RBC AUTO-ENTMCNC: 31.6 G/DL (ref 31–37)
MCV RBC: 94.1 FL (ref 80–100)
MONOCYTES NFR BLD: 25 % (ref 2–11)
NEUTROPHILS NFR BLD AUTO: 45 % (ref 40–80)
OSMOLALITY SERPL CALC.SUM OF ELEC: 286 MOSM/KG (ref 275–300)
PLATELET # BLD: 240 10X3/UL (ref 130–400)
PMV BLD AUTO: 10.1 FL (ref 7.4–10.4)
POTASSIUM SERPL-SCNC: 4.2 MMOL/L (ref 3.5–5.1)
RBC # BLD AUTO: 3.7 10X6/UL (ref 4.2–6.1)
SODIUM SERPL-SCNC: 137 MMOL/L (ref 136–145)
WBC # BLD AUTO: 6.2 10X3/UL (ref 4.8–10.8)

## 2020-03-19 NOTE — NUR
PT HAD LARGE AMOUNT (APPX 100 ML ??) DARK COLORED BLOOD EXPELL FROM [facility] WHEN
HE WAS SITTING ON TOILET THIS MORNING FOR BM. HE DENIED PAIN. PT CLEANED UP
AND NO ADDITIONAL LEAKING NOTED. DR BELTRAN NOTIFIED.

## 2020-03-19 NOTE — NUR
DR BELTRAN NOTIFIED THAT DR FAYE IS NO LONGER TAKING NEW PTS AND IS LEAVING
THIS HOSP. NO NEW ORDERS

## 2020-03-19 NOTE — NUR
PATIENT MEDICATION GIVEN. BASELINE FSBS 137. PATIENT HAD NO C/O PAIN OR
DISTRESS. WIFE IN ROOM. CALL LIGHT WITHIN REACH. WILL CONTINUE TO MONITOR.

## 2020-03-19 NOTE — NUR
PATIENT ADMITTED TO REHAB FROM ACUTE FLOOR. PATIENT DISCHARGE PLANS ARE TO
RETURN HOME. PATIENT HAS DIALYSIS ON M-W-F @ 0600 IN [place]. WILL CONTINUE TO
FOLLOW WITH PATIENT.

## 2020-03-20 VITALS — DIASTOLIC BLOOD PRESSURE: 48 MMHG | SYSTOLIC BLOOD PRESSURE: 140 MMHG

## 2020-03-20 VITALS — SYSTOLIC BLOOD PRESSURE: 115 MMHG | DIASTOLIC BLOOD PRESSURE: 35 MMHG

## 2020-03-20 LAB
ANION GAP SERPL CALC-SCNC: 14.1 MMOL/L (ref 8–16)
BASOPHILS NFR BLD AUTO: 0.5 % (ref 0–2)
BUN SERPL-MCNC: 38 MG/DL (ref 7–18)
CALCIUM SERPL-MCNC: 8 MG/DL (ref 8.5–10.1)
CHLORIDE SERPL-SCNC: 95 MMOL/L (ref 98–107)
CO2 SERPL-SCNC: 29.7 MMOL/L (ref 21–32)
CREAT SERPL-MCNC: 7.9 MG/DL (ref 0.6–1.3)
EOSINOPHIL NFR BLD: 5.1 % (ref 0–7)
ERYTHROCYTE [DISTWIDTH] IN BLOOD BY AUTOMATED COUNT: 15 % (ref 11.5–14.5)
GLUCOSE SERPL-MCNC: 84 MG/DL (ref 74–106)
HCT VFR BLD CALC: 34.3 % (ref 42–54)
HGB BLD-MCNC: 10.8 G/DL (ref 13.5–17.5)
IMM GRANULOCYTES NFR BLD: 0.3 % (ref 0–5)
LYMPHOCYTES NFR BLD AUTO: 19.8 % (ref 15–50)
MCH RBC QN AUTO: 29.7 PG (ref 26–34)
MCHC RBC AUTO-ENTMCNC: 31.5 G/DL (ref 31–37)
MCV RBC: 94.2 FL (ref 80–100)
MONOCYTES NFR BLD: 18.5 % (ref 2–11)
NEUTROPHILS NFR BLD AUTO: 55.8 % (ref 40–80)
OSMOLALITY SERPL CALC.SUM OF ELEC: 277 MOSM/KG (ref 275–300)
PLATELET # BLD: 251 10X3/UL (ref 130–400)
PMV BLD AUTO: 10.9 FL (ref 7.4–10.4)
POTASSIUM SERPL-SCNC: 3.8 MMOL/L (ref 3.5–5.1)
RBC # BLD AUTO: 3.64 10X6/UL (ref 4.2–6.1)
SODIUM SERPL-SCNC: 135 MMOL/L (ref 136–145)
WBC # BLD AUTO: 6.1 10X3/UL (ref 4.8–10.8)

## 2020-03-20 NOTE — NUR
SITTING IN WC IN ROOM FOR BREAKFAST. IS WEAK AND UNABLE TO STAND BY SELF. HE
FOLLOWS COMMANDS SLOWLY. DENIES PAIN OR INCREASED SOB. CALL LIGHT IN REACH

## 2020-03-20 NOTE — NUR
LAYING DOWN IN BED RESTING QUIETLY. NO S/S DISTRESS. SIDE RAILS UP X2. BED IN
LOWEST POSITION. CALL LIGHT IN REACH

## 2020-03-20 NOTE — NUR
PATIENT HAS HAD NO ADVERSE REACTION TO ANTIBIOTICS. WIFE AT BEDSIDE. CALL
LIGHTR WITHIN REACH. WILL CONTINUE TO MONITOR.

## 2020-03-20 NOTE — NUR
PATIENT EYES CLOSED. RESPIRATIONS 20 & EVEN. 02 AT 4LHF. WIFE AT BEDSIDE. BED
LOW. CALL LIGHT WITHIN REACH. WILL CONTINUE TO MONITOR.

## 2020-03-20 NOTE — NUR
PATIENT TAKEN OFF BED PAN. NO BM. BED LOW. WIFE AT BEDSIDE. CALL LIGHT WITHIN
REACH. WILL CONTINUE TO MONITOR.

## 2020-03-20 NOTE — NUR
PATIENT RECEIVED LAYING IN BED. WIFE AT BEDSIDE. PATIENT CONTINUES ON DROPLET
ISOLATION. ASSESSMENT & VITAL SIGNS DONE. WILL CONTINUE TO MONITOR.

## 2020-03-21 VITALS — SYSTOLIC BLOOD PRESSURE: 160 MMHG | DIASTOLIC BLOOD PRESSURE: 77 MMHG

## 2020-03-21 LAB
ANION GAP SERPL CALC-SCNC: 16.9 MMOL/L (ref 8–16)
BASOPHILS NFR BLD AUTO: 0.3 % (ref 0–2)
BUN SERPL-MCNC: 56 MG/DL (ref 7–18)
CALCIUM SERPL-MCNC: 8.8 MG/DL (ref 8.5–10.1)
CHLORIDE SERPL-SCNC: 94 MMOL/L (ref 98–107)
CO2 SERPL-SCNC: 27.9 MMOL/L (ref 21–32)
CREAT SERPL-MCNC: 10.1 MG/DL (ref 0.6–1.3)
EOSINOPHIL NFR BLD: 5.3 % (ref 0–7)
ERYTHROCYTE [DISTWIDTH] IN BLOOD BY AUTOMATED COUNT: 14.8 % (ref 11.5–14.5)
GLUCOSE SERPL-MCNC: 87 MG/DL (ref 74–106)
HCT VFR BLD CALC: 36 % (ref 42–54)
HGB BLD-MCNC: 11.5 G/DL (ref 13.5–17.5)
IMM GRANULOCYTES NFR BLD: 0.1 % (ref 0–5)
LYMPHOCYTES NFR BLD AUTO: 18.4 % (ref 15–50)
MCH RBC QN AUTO: 30.3 PG (ref 26–34)
MCHC RBC AUTO-ENTMCNC: 31.9 G/DL (ref 31–37)
MCV RBC: 94.7 FL (ref 80–100)
MONOCYTES NFR BLD: 16.1 % (ref 2–11)
NEUTROPHILS NFR BLD AUTO: 59.8 % (ref 40–80)
OSMOLALITY SERPL CALC.SUM OF ELEC: 282 MOSM/KG (ref 275–300)
PHOSPHATE SERPL-MCNC: 7.9 MG/DL (ref 2.5–4.9)
PLATELET # BLD: 266 10X3/UL (ref 130–400)
PMV BLD AUTO: 10.7 FL (ref 7.4–10.4)
POTASSIUM SERPL-SCNC: 4.8 MMOL/L (ref 3.5–5.1)
RBC # BLD AUTO: 3.8 10X6/UL (ref 4.2–6.1)
SODIUM SERPL-SCNC: 134 MMOL/L (ref 136–145)
WBC # BLD AUTO: 7.2 10X3/UL (ref 4.8–10.8)

## 2020-03-21 NOTE — NUR
PT UP IN CHAIR WATCHING TV, WIFE IN ROOM, BED STRAIGHTENED, VITALS TAKEN,
FLUIDS AND CALL LIGHT WITHIN REACH, FALL PRECAUTIONS IN PLACE

## 2020-03-21 NOTE — NUR
PATIENT EYES CLOSED. RESPIRATIONS 18 & EVEN. WIFE AT BEDSIDE. BED LOW. CALL
LIGHT WITHIN REACH. WILL CONTINUE TO MONITOR.

## 2020-03-22 VITALS — SYSTOLIC BLOOD PRESSURE: 134 MMHG | DIASTOLIC BLOOD PRESSURE: 72 MMHG

## 2020-03-22 VITALS — DIASTOLIC BLOOD PRESSURE: 55 MMHG | SYSTOLIC BLOOD PRESSURE: 165 MMHG

## 2020-03-22 NOTE — NUR
PT C/O LEG/BACK/NECK PAIN UNABLE TO SLEEP, TRAMADOL GIVEN, NO OTHER NEEDS
NOTED, PT REMAINS ON DROPLET PRECAUTION ISO

## 2020-03-22 NOTE — NUR
PT IN BED ON RT SIDE, WIFE IN CHAIR AT BED SIDE, CALL LIGHT/FLUIDS WITHIN
REACH, VITALS TAKEN, NO NEEDS NOTED, FALL PRECAUTIONS IN PLACE

## 2020-03-22 NOTE — NUR
PT ASLEEP AROUSES EASILY TO VOICE, NO NEEDS NOTED, FLUIDS, CALL LIGHT AND WIFE
AT BEDSIDE, FALL PRECAUTION ALARMS IN PLACE AND WORKING

## 2020-03-23 VITALS — SYSTOLIC BLOOD PRESSURE: 167 MMHG | DIASTOLIC BLOOD PRESSURE: 107 MMHG

## 2020-03-23 VITALS — DIASTOLIC BLOOD PRESSURE: 47 MMHG | SYSTOLIC BLOOD PRESSURE: 139 MMHG

## 2020-03-23 VITALS — DIASTOLIC BLOOD PRESSURE: 59 MMHG | SYSTOLIC BLOOD PRESSURE: 142 MMHG

## 2020-03-23 LAB
ANION GAP SERPL CALC-SCNC: 16.1 MMOL/L (ref 8–16)
BASOPHILS NFR BLD AUTO: 0.3 % (ref 0–2)
BUN SERPL-MCNC: 42 MG/DL (ref 7–18)
CALCIUM SERPL-MCNC: 8.2 MG/DL (ref 8.5–10.1)
CHLORIDE SERPL-SCNC: 95 MMOL/L (ref 98–107)
CO2 SERPL-SCNC: 27.4 MMOL/L (ref 21–32)
CREAT SERPL-MCNC: 8.5 MG/DL (ref 0.6–1.3)
EOSINOPHIL NFR BLD: 7.6 % (ref 0–7)
ERYTHROCYTE [DISTWIDTH] IN BLOOD BY AUTOMATED COUNT: 14.7 % (ref 11.5–14.5)
GLUCOSE SERPL-MCNC: 93 MG/DL (ref 74–106)
HCT VFR BLD CALC: 36.2 % (ref 42–54)
HGB BLD-MCNC: 11.3 G/DL (ref 13.5–17.5)
IMM GRANULOCYTES NFR BLD: 0.2 % (ref 0–5)
LYMPHOCYTES NFR BLD AUTO: 16.5 % (ref 15–50)
MCH RBC QN AUTO: 29.4 PG (ref 26–34)
MCHC RBC AUTO-ENTMCNC: 31.2 G/DL (ref 31–37)
MCV RBC: 94.3 FL (ref 80–100)
MONOCYTES NFR BLD: 13.6 % (ref 2–11)
NEUTROPHILS NFR BLD AUTO: 61.8 % (ref 40–80)
OSMOLALITY SERPL CALC.SUM OF ELEC: 278 MOSM/KG (ref 275–300)
PLATELET # BLD: 261 10X3/UL (ref 130–400)
PMV BLD AUTO: 11 FL (ref 7.4–10.4)
POTASSIUM SERPL-SCNC: 4.5 MMOL/L (ref 3.5–5.1)
RBC # BLD AUTO: 3.84 10X6/UL (ref 4.2–6.1)
SODIUM SERPL-SCNC: 134 MMOL/L (ref 136–145)
WBC # BLD AUTO: 5.9 10X3/UL (ref 4.8–10.8)

## 2020-03-23 NOTE — NUR
SITTING UP IN WC FOR BREAKFAST. IS SLOW TO FOLLOW COMMANDS. ORIENTED X1.
ATTEMPTS TO ANSWER QUESTIONS BUT OFTEN DOES NOT FINISH SENTENCE. OXYGEN IN
USE, NC 4L. DENIES PAIN. CALL LIGHT IN REACH. FAMILY IN ROOM

## 2020-03-23 NOTE — NUR
Nutrition Follow Up
 
Comments: Patient with therapy this am. Patient has been eating okay. 52% avg
po intake for the past 9 meals. Patient has not had a BM recorded since 3/17.
Pt has been tolerating Nepro and had 480 ml on 3/21 and 240 ml on 3/22.
 
Diet: Renal Mechanical Soft, Nepro with meals
PO intake: 52% avg x 9 meals.
BM x 1 on 3/17
Wt: 231 lbs on 3/18. No new wt recorded
 
Significant Meds: Vit B complex, Crestor, Sevelamer Carbonate
Significant Labs: Na- 134(L), Cl- 95(L), BUN- 42(H), Creatinine- 8.5(H),
                  Ca- 8.2(L), Phos- 7.9(H)
 
Continue current diet as tolerated
Continue nepro as tolerated
Suggest laxative for constipation
 
Clinical dietitian to continue monitoring and following pt DHS.

## 2020-03-23 NOTE — NUR
LAYING DOWN IN BED RESTING QUIETLY. SIDE RAILS UP X2. EYES CLOSED. OXYGEN IN
PLACE, NO S/S DISTRESS. CALL LIGHT IN REACH

## 2020-03-23 NOTE — NUR
PT IS RESTING IN BED WITH EYES OPEN. ALERT TO SELF. DENIES NEEDS AT THIS
TIME, BUT REQUESTED HIS SPAP BE PUT ON AT 8PM. RIGHT ARM FISTULA NOTED WITH
GOOD BRUITT AND THRILL. O2 IS ON @ 4LPM PER HIGH FLOW CANNULA. NO SOB NOTED.
SR'S ARE UP X 3 IN BED. CALL LIGHT AND BEDSIDE TABLE ARE WITHIN EASY REACH.
SPOUSE IS RESTING IN A RECLINER IN THE ROOM.

## 2020-03-24 VITALS — SYSTOLIC BLOOD PRESSURE: 194 MMHG | DIASTOLIC BLOOD PRESSURE: 115 MMHG

## 2020-03-24 VITALS — DIASTOLIC BLOOD PRESSURE: 41 MMHG | SYSTOLIC BLOOD PRESSURE: 169 MMHG

## 2020-03-24 NOTE — NUR
CVL D'C'D. DIRECT PRESSURE APPLIED FOR 15 MIN. OCCLUSIVE DSG APPLIED OVER 2X2
AND WOUND. NO S/S BLEEDING.

## 2020-03-24 NOTE — NUR
GREETED PATIENT AND INTRODUCED MYSELF AS HIS NURSE. PATIENT IS SITTING IN
WHEELCHAIR AT THIS TIME WITH FAMILY MEMBER SITTING IN RECLINER AT BEDSIDE. O2
AT 4L IN USE VIA NC. RESPIRATIONS EVEN. NO S/S OF DISTRESS. CHANGED PTS BED
UPON REQUEST. DENIES ANY FURTHER NEEDS AT THIS TIME. CALL LIGHT LAYING IN BED
WHERE PATIENT CAN REACH WITH EASE.

## 2020-03-25 VITALS — DIASTOLIC BLOOD PRESSURE: 91 MMHG | SYSTOLIC BLOOD PRESSURE: 152 MMHG

## 2020-03-25 VITALS — DIASTOLIC BLOOD PRESSURE: 86 MMHG | SYSTOLIC BLOOD PRESSURE: 151 MMHG

## 2020-03-25 VITALS — SYSTOLIC BLOOD PRESSURE: 100 MMHG | DIASTOLIC BLOOD PRESSURE: 40 MMHG

## 2020-03-25 VITALS — DIASTOLIC BLOOD PRESSURE: 77 MMHG | SYSTOLIC BLOOD PRESSURE: 172 MMHG

## 2020-03-25 LAB
ANION GAP SERPL CALC-SCNC: 17.9 MMOL/L (ref 8–16)
BUN SERPL-MCNC: 42 MG/DL (ref 7–18)
CALCIUM SERPL-MCNC: 7.8 MG/DL (ref 8.5–10.1)
CHLORIDE SERPL-SCNC: 96 MMOL/L (ref 98–107)
CO2 SERPL-SCNC: 25.5 MMOL/L (ref 21–32)
CREAT SERPL-MCNC: 8.6 MG/DL (ref 0.6–1.3)
ERYTHROCYTE [DISTWIDTH] IN BLOOD BY AUTOMATED COUNT: 14.7 % (ref 11.5–14.5)
GLUCOSE SERPL-MCNC: 98 MG/DL (ref 74–106)
HCT VFR BLD CALC: 33.7 % (ref 42–54)
HGB BLD-MCNC: 10.6 G/DL (ref 13.5–17.5)
LYMPHOCYTES NFR BLD AUTO: 19.6 % (ref 15–50)
MCH RBC QN AUTO: 29.4 PG (ref 26–34)
MCHC RBC AUTO-ENTMCNC: 31.5 G/DL (ref 31–37)
MCV RBC: 93.6 FL (ref 80–100)
NEUTROPHILS NFR BLD AUTO: 61.1 % (ref 40–80)
OSMOLALITY SERPL CALC.SUM OF ELEC: 280 MOSM/KG (ref 275–300)
PLATELET # BLD: 265 10X3/UL (ref 130–400)
PMV BLD AUTO: 10.1 FL (ref 7.4–10.4)
POTASSIUM SERPL-SCNC: 4.4 MMOL/L (ref 3.5–5.1)
RBC # BLD AUTO: 3.6 10X6/UL (ref 4.2–6.1)
SODIUM SERPL-SCNC: 135 MMOL/L (ref 136–145)
WBC # BLD AUTO: 5.6 10X3/UL (ref 4.8–10.8)

## 2020-03-25 NOTE — NUR
CARE TEAM MEETING:
SPOUSE ATTENDED THE MEETING. HER QUESTIONS AND CONCERNS WERE ADDRESSED.
TENATIVE DISCHARGE DATE IS 4/9/20. WILL CONTINUE TO FOLLOW WITH PATIENT.

## 2020-03-25 NOTE — NUR
GREETED PATIENT AND INTRODUCED MYSLEF AS HIS NURSE. PATIENT IS CURRENTLY
SITTING IN WHEELCHAIR WATCHING TV. FAMILY MEMBER AT BEDSIDE. O2 AT 2L IN USE
VIA NC. RESPIRATIONS EVEN. NO S/S OF DISTRESS. DENIES ANY FURTHER NEEDS AT
THIS TIME. CALL LIGHT IN REACH.

## 2020-03-26 VITALS — DIASTOLIC BLOOD PRESSURE: 38 MMHG | SYSTOLIC BLOOD PRESSURE: 114 MMHG

## 2020-03-26 VITALS — SYSTOLIC BLOOD PRESSURE: 160 MMHG | DIASTOLIC BLOOD PRESSURE: 55 MMHG

## 2020-03-26 VITALS — DIASTOLIC BLOOD PRESSURE: 55 MMHG | SYSTOLIC BLOOD PRESSURE: 204 MMHG

## 2020-03-26 VITALS — SYSTOLIC BLOOD PRESSURE: 108 MMHG | DIASTOLIC BLOOD PRESSURE: 40 MMHG

## 2020-03-26 NOTE — NUR
PT RESTING QUIETLY WITH EYES CLOSED. RESPIRTIONS EVEN. NO S/S OF DISTRESS.
BIPAP MACHINE IN USE. FAMILY MEMBER ASLEEP IN CHAIR AT BEDSIDE. CALL LIGHT
WITHIN REACH.

## 2020-03-26 NOTE — NUR
PT RESTING QUIETLY WITH EYES CLOSED. RESPIRATIONS EVEN. NO S/S OF DISTRESS.
BIPAP IN USE. CALL LIGHT IN REACH.

## 2020-03-26 NOTE — NUR
GREETED PATIENT AND INTRODUCED MYSELF AS HIS NURSE. PATIENT IS CURRENTLY
SITTING IN WHEELCHAIR AT THIS TIME. O2 AT 2L IN USE VIA NC. RESPIRATIONS EVEN.
NO S/S OF DISTRESS. CALL LIGHT IN REACH.

## 2020-03-27 VITALS — DIASTOLIC BLOOD PRESSURE: 114 MMHG | SYSTOLIC BLOOD PRESSURE: 187 MMHG

## 2020-03-27 VITALS — SYSTOLIC BLOOD PRESSURE: 162 MMHG | DIASTOLIC BLOOD PRESSURE: 96 MMHG

## 2020-03-27 VITALS — SYSTOLIC BLOOD PRESSURE: 189 MMHG | DIASTOLIC BLOOD PRESSURE: 66 MMHG

## 2020-03-27 VITALS — SYSTOLIC BLOOD PRESSURE: 151 MMHG | DIASTOLIC BLOOD PRESSURE: 36 MMHG

## 2020-03-27 NOTE — NUR
RIGHT HEEL WAS NOTED TO HAVE A BLACKENED BLISTER ON 3/21/20.  THE BLACK AREA
HAS SLOUGHED OFF TO REVEAL PINK INTACT SKIN.  MEPILEX SPONGE IS BEING USED TO
PROTECT THE SKIN.
WOUND CARE WILL CONTINUE MONITORING.

## 2020-03-27 NOTE — NUR
AWAKE AND WATCHING TV IN ROOM. REMAINS IN DROPLET ISOLATION. RESPIRATIONS
UNLABORED. O2/2L ON PER NASAL CANNULA.
NO ACUTE DISTRESS NOTED. CALL LIGHT IN REACH.

## 2020-03-27 NOTE — NUR
SITTING UP IN WC IN ROOM. DENIES NEEDS OR C/O. HIS EYES ARE CLOSED BUT HE WILL
TALK AND ANSWER QUESTIONS. OXYGEN IN PLACE. CALL LIGHT IN REACH

## 2020-03-28 VITALS — SYSTOLIC BLOOD PRESSURE: 190 MMHG | DIASTOLIC BLOOD PRESSURE: 69 MMHG

## 2020-03-28 VITALS — DIASTOLIC BLOOD PRESSURE: 65 MMHG | SYSTOLIC BLOOD PRESSURE: 155 MMHG

## 2020-03-28 VITALS — DIASTOLIC BLOOD PRESSURE: 64 MMHG | SYSTOLIC BLOOD PRESSURE: 154 MMHG

## 2020-03-28 VITALS — DIASTOLIC BLOOD PRESSURE: 62 MMHG | SYSTOLIC BLOOD PRESSURE: 162 MMHG

## 2020-03-28 VITALS — SYSTOLIC BLOOD PRESSURE: 159 MMHG | DIASTOLIC BLOOD PRESSURE: 69 MMHG

## 2020-03-28 NOTE — NUR
REFUSES TO LEAVE BIPAP ON. RESPIRATORY THERAPIST NOTIFED. SHE STATED TO PLACE
HIM ON O2/2L PER NASAL CANNULA.

## 2020-03-28 NOTE — NUR
RESTING QUIETLY IN BED. HEAD OF BED ELEVATED 60 DEGREES. OXYGEN IN PLACE. SIDE
RAILS UP X2. CALL LIGHT IN REACH

## 2020-03-28 NOTE — NUR
SITTING UP IN WC IN ROOM TALKING ON PHONE WITH WIFE. HIS APPETITE IS POOR. HE
HAD DIALYSIS IN HIS ROOM TODAY

## 2020-03-28 NOTE — NUR
PT SITTING UP IN WHEELCHAIR AT BEDSIDE. CL IN REACH. DENIES NEEDS OR PAIN AT
THIS TIME. RESP EVEN AND UNLABORED. LUNGS CLEAR. BOWEL ACTIVE X4. FISTULA TO R
ARM INTACT. PACEMAKER TO LEFT CHEST. WILL CONTINUE TO MONITOR.

## 2020-03-29 VITALS — DIASTOLIC BLOOD PRESSURE: 130 MMHG | SYSTOLIC BLOOD PRESSURE: 188 MMHG

## 2020-03-29 VITALS — DIASTOLIC BLOOD PRESSURE: 89 MMHG | SYSTOLIC BLOOD PRESSURE: 183 MMHG

## 2020-03-29 VITALS — SYSTOLIC BLOOD PRESSURE: 100 MMHG | DIASTOLIC BLOOD PRESSURE: 59 MMHG

## 2020-03-29 VITALS — DIASTOLIC BLOOD PRESSURE: 84 MMHG | SYSTOLIC BLOOD PRESSURE: 178 MMHG

## 2020-03-29 LAB — AMPHOTERICIN B ISLT MIC: (no result)

## 2020-03-29 NOTE — NUR
PT SITTING UP IN WHEELCHAIR. CL IN REACH. DENIES NEEDS AT THIS TIME. REQUEST
PAIN MED WAITING ON HOUSE SUP TO PULL FROM PYXIS. RESP EVEN AND UNLABORED. A/O
X4. LUNGS CLEAR. BOWEL ACTIVE X4. FISTULA TO R ARM INTACT. WILL CONTINUE TO
MONITOR.

## 2020-03-30 VITALS — DIASTOLIC BLOOD PRESSURE: 78 MMHG | SYSTOLIC BLOOD PRESSURE: 115 MMHG

## 2020-03-30 VITALS — SYSTOLIC BLOOD PRESSURE: 161 MMHG | DIASTOLIC BLOOD PRESSURE: 129 MMHG

## 2020-03-30 VITALS — SYSTOLIC BLOOD PRESSURE: 150 MMHG | DIASTOLIC BLOOD PRESSURE: 63 MMHG

## 2020-03-30 VITALS — DIASTOLIC BLOOD PRESSURE: 66 MMHG | SYSTOLIC BLOOD PRESSURE: 174 MMHG

## 2020-03-30 LAB
ANION GAP SERPL CALC-SCNC: 16.5 MMOL/L (ref 8–16)
BASOPHILS NFR BLD AUTO: 0.4 % (ref 0–2)
BUN SERPL-MCNC: 44 MG/DL (ref 7–18)
CALCIUM SERPL-MCNC: 8.6 MG/DL (ref 8.5–10.1)
CHLORIDE SERPL-SCNC: 92 MMOL/L (ref 98–107)
CO2 SERPL-SCNC: 25.9 MMOL/L (ref 21–32)
CREAT SERPL-MCNC: 9.1 MG/DL (ref 0.6–1.3)
EOSINOPHIL NFR BLD: 8.2 % (ref 0–7)
ERYTHROCYTE [DISTWIDTH] IN BLOOD BY AUTOMATED COUNT: 14.8 % (ref 11.5–14.5)
GLUCOSE SERPL-MCNC: 109 MG/DL (ref 74–106)
HCT VFR BLD CALC: 37.2 % (ref 42–54)
HGB BLD-MCNC: 11.6 G/DL (ref 13.5–17.5)
IMM GRANULOCYTES NFR BLD: 0 % (ref 0–5)
LYMPHOCYTES NFR BLD AUTO: 18.1 % (ref 15–50)
MCH RBC QN AUTO: 29 PG (ref 26–34)
MCHC RBC AUTO-ENTMCNC: 31.2 G/DL (ref 31–37)
MCV RBC: 93 FL (ref 80–100)
MONOCYTES NFR BLD: 12.1 % (ref 2–11)
NEUTROPHILS NFR BLD AUTO: 61.2 % (ref 40–80)
OSMOLALITY SERPL CALC.SUM OF ELEC: 272 MOSM/KG (ref 275–300)
PHOSPHATE SERPL-MCNC: 4.7 MG/DL (ref 2.5–4.9)
PLATELET # BLD: 289 10X3/UL (ref 130–400)
PMV BLD AUTO: 10 FL (ref 7.4–10.4)
POTASSIUM SERPL-SCNC: 4.4 MMOL/L (ref 3.5–5.1)
RBC # BLD AUTO: 4 10X6/UL (ref 4.2–6.1)
SODIUM SERPL-SCNC: 130 MMOL/L (ref 136–145)
WBC # BLD AUTO: 4.9 10X3/UL (ref 4.8–10.8)

## 2020-03-30 NOTE — NUR
SITTING IN WC IN ROOM FOR BREAKFAST. ITEMS SURROUNDING HIM ARE DISORGANIZED
AND SOME ARE IN FLOOR. HE DOES NOT TRY TO COLLECT ITEMS IN AN AREA, THEY ARE
SCATTERED. HE DENIES INCREASED PAIN. HE WILL SPEAK 5-6 WORDS PER BREATH. CALL
LIGHT IN REACH

## 2020-03-30 NOTE — NUR
SITTING IN WC IN ROOM TALKING ON PHONE. DENIES INCREASED PAIN TO NECK. IS SLOW
TO ANSWER FOLLOW COMMANDS. DENIES INCREASED SOB, HE CANT SEEM TO FIGURE OUT
HOW TO PUT HIS OXYGEN TUBING IN HIS NOSE MOST OF THE TIME. HE IS CONT OF
BOWEL. CALL LIGHT IN REACH

## 2020-03-30 NOTE — NUR
Nutrition follow-up:
Diet: renal mechanical soft with thin liquids
PO intake ~50% of meals
Labs reviewed
WT: 231#
+BM
RDN following.

## 2020-03-30 NOTE — NUR
GREETED PATIENT AND INTRODUCED MYSELF AS HIS NURSE. PATIENT IS CURRENTLY
UNDERGOING DIALYSIS IN ROOM WITH DIALYSIS NURSE. O2 AT 2L IN USE VIA NC.
RESPIRATIONS EVEN. NO S/S OF DISTRESS. CALL LIGHT IN REACH.

## 2020-03-30 NOTE — NUR
PT RESTING QUIETLY WITH EYES CLOSED. RESPIRATIONS EVEN. NO S/S OF DISTRESS.
BIPAP IN USE. CALL LIGHT WITHIN REACH.

## 2020-03-31 VITALS — DIASTOLIC BLOOD PRESSURE: 61 MMHG | SYSTOLIC BLOOD PRESSURE: 83 MMHG

## 2020-03-31 VITALS — DIASTOLIC BLOOD PRESSURE: 40 MMHG | SYSTOLIC BLOOD PRESSURE: 127 MMHG

## 2020-03-31 NOTE — NUR
PT RESTING QUIETLY WITH EYES CLOSED. RESPIRATIONS EVEN. NO S/S OF DISTRESS. O2
AT 3L IN USE VIA NC. CALL LIGHT WITHIN REACH LAYING ON PATIENTS LAP.

## 2020-03-31 NOTE — NUR
PATIENT DISCHARGING HOME TODAY WITH FAMILY.[facility] WILL PROVIDE
THERAPY AT HOME.PATIENT WILL CONTINUE WITH SAME HD DAYS AT [facility], M-W-F- @ 6:00 AM. NO NEW DME NEEDED AT THIS TIME. PATIENT CHOICE FORM
FOR HOME HEALTH SIGNED AND FILED IN CHART, NO COMPARE DATA REVIEWED AS PATIENT
HAS HAD [facility] IN THE PAST. [facility] FORM SIGNED, EXPLAINED AND FILED IN CHART. ONE
GIVEN TO PATIENT. DSICHARGE INSTRUCTIONS FAXED TO HOME HEALTH AND REVIEWED
WITH PATIENT PER PRIMARY NURSE.

## 2020-10-19 ENCOUNTER — HOSPITAL ENCOUNTER (EMERGENCY)
Dept: HOSPITAL 84 - D.ER | Age: 65
Discharge: HOME | End: 2020-10-19
Payer: MEDICARE

## 2020-10-19 VITALS
BODY MASS INDEX: 34.17 KG/M2 | HEIGHT: 68 IN | WEIGHT: 225.47 LBS | WEIGHT: 225.47 LBS | BODY MASS INDEX: 34.17 KG/M2 | HEIGHT: 68 IN

## 2020-10-19 VITALS — SYSTOLIC BLOOD PRESSURE: 144 MMHG | DIASTOLIC BLOOD PRESSURE: 96 MMHG

## 2020-10-19 DIAGNOSIS — Z95.5: ICD-10-CM

## 2020-10-19 DIAGNOSIS — N18.6: ICD-10-CM

## 2020-10-19 DIAGNOSIS — Z95.0: ICD-10-CM

## 2020-10-19 DIAGNOSIS — I25.10: ICD-10-CM

## 2020-10-19 DIAGNOSIS — Z99.2: ICD-10-CM

## 2020-10-19 DIAGNOSIS — K14.8: Primary | ICD-10-CM

## 2020-10-19 DIAGNOSIS — K21.9: ICD-10-CM

## 2020-10-19 DIAGNOSIS — I10: ICD-10-CM

## 2020-10-19 DIAGNOSIS — E11.9: ICD-10-CM

## 2020-10-19 DIAGNOSIS — I25.2: ICD-10-CM

## 2020-10-19 LAB
ALBUMIN SERPL-MCNC: 3.6 G/DL (ref 3.4–5)
ALP SERPL-CCNC: 97 U/L (ref 30–120)
ALT SERPL-CCNC: 34 U/L (ref 10–68)
ANION GAP SERPL CALC-SCNC: 15.2 MMOL/L (ref 8–16)
APTT BLD: 30.2 SECONDS (ref 22.8–39.4)
BASOPHILS NFR BLD AUTO: 0.4 % (ref 0–2)
BILIRUB SERPL-MCNC: 0.49 MG/DL (ref 0.2–1.3)
BUN SERPL-MCNC: 43 MG/DL (ref 7–18)
CALCIUM SERPL-MCNC: 9.1 MG/DL (ref 8.5–10.1)
CHLORIDE SERPL-SCNC: 100 MMOL/L (ref 98–107)
CO2 SERPL-SCNC: 24.3 MMOL/L (ref 21–32)
CREAT SERPL-MCNC: 9.3 MG/DL (ref 0.6–1.3)
EOSINOPHIL NFR BLD: 6.6 % (ref 0–7)
ERYTHROCYTE [DISTWIDTH] IN BLOOD BY AUTOMATED COUNT: 16.6 % (ref 11.5–14.5)
GLOBULIN SER-MCNC: 4.9 G/L
GLUCOSE SERPL-MCNC: 119 MG/DL (ref 74–106)
HCT VFR BLD CALC: 43.2 % (ref 42–54)
HGB BLD-MCNC: 14 G/DL (ref 13.5–17.5)
IMM GRANULOCYTES NFR BLD: 0.3 % (ref 0–5)
INR PPP: 0.98 (ref 0.85–1.17)
LYMPHOCYTES NFR BLD AUTO: 13.3 % (ref 15–50)
MCH RBC QN AUTO: 30 PG (ref 26–34)
MCHC RBC AUTO-ENTMCNC: 32.4 G/DL (ref 31–37)
MCV RBC: 92.5 FL (ref 80–100)
MONOCYTES NFR BLD: 11.4 % (ref 2–11)
NEUTROPHILS NFR BLD AUTO: 68 % (ref 40–80)
OSMOLALITY SERPL CALC.SUM OF ELEC: 281 MOSM/KG (ref 275–300)
PLATELET # BLD: 196 10X3/UL (ref 130–400)
PMV BLD AUTO: 10.4 FL (ref 7.4–10.4)
POTASSIUM SERPL-SCNC: 4.5 MMOL/L (ref 3.5–5.1)
PROT SERPL-MCNC: 8.5 G/DL (ref 6.4–8.2)
PROTHROMBIN TIME: 13 SECONDS (ref 11.6–15)
RBC # BLD AUTO: 4.67 10X6/UL (ref 4.2–6.1)
SODIUM SERPL-SCNC: 135 MMOL/L (ref 136–145)
WBC # BLD AUTO: 6.9 10X3/UL (ref 4.8–10.8)

## 2021-11-15 NOTE — NUR
NOTED ORDERS WHICH HAD BEEN RECIEVED FOR IN/OUT CATH. DR HILTON NOTIFIED THAT
PT IS ANURIC AND DOES NOT URINATE ANYMORE PER PTS WIFE WHO STATES IT HAS BEEN
A WHILE SINCE HE HAS URINATED. NO ORDERS RECIEVED TO CANCEL ORDER, THEREFORE
IN/OUT CATH PERFORMED VIA STERILE PROCEDURE WITH NO URINE COLLECTED/OBTAINED. Yes...